# Patient Record
Sex: FEMALE | Race: BLACK OR AFRICAN AMERICAN | Employment: FULL TIME | URBAN - METROPOLITAN AREA
[De-identification: names, ages, dates, MRNs, and addresses within clinical notes are randomized per-mention and may not be internally consistent; named-entity substitution may affect disease eponyms.]

---

## 2018-01-18 ENCOUNTER — HOSPITAL ENCOUNTER (OUTPATIENT)
Dept: MAMMOGRAPHY | Age: 48
Discharge: HOME | End: 2018-01-18
Attending: ALLERGY & IMMUNOLOGY | Admitting: ALLERGY & IMMUNOLOGY

## 2018-01-18 DIAGNOSIS — Z12.31 VISIT FOR SCREENING MAMMOGRAM: ICD-10-CM

## 2019-01-21 ENCOUNTER — HOSPITAL ENCOUNTER (OUTPATIENT)
Dept: MAMMOGRAPHY | Age: 49
Discharge: HOME | End: 2019-01-21
Payer: COMMERCIAL

## 2019-01-21 DIAGNOSIS — Z12.31 VISIT FOR SCREENING MAMMOGRAM: ICD-10-CM

## 2019-01-21 PROCEDURE — 77063 BREAST TOMOSYNTHESIS BI: CPT

## 2019-08-29 ENCOUNTER — HOSPITAL ENCOUNTER (OUTPATIENT)
Dept: MAMMOGRAPHY | Age: 49
Discharge: HOME | End: 2019-08-29
Payer: COMMERCIAL

## 2019-08-29 ENCOUNTER — HOSPITAL ENCOUNTER (OUTPATIENT)
Dept: ULTRASOUND IMAGING | Age: 49
Discharge: HOME | End: 2019-08-29
Payer: COMMERCIAL

## 2019-08-29 DIAGNOSIS — N63.0 BREAST MASS: ICD-10-CM

## 2019-08-29 DIAGNOSIS — N63.0 BREAST LUMP: ICD-10-CM

## 2019-08-29 DIAGNOSIS — R92.8 ABNORMAL FINDING ON MAMMOGRAPHY: ICD-10-CM

## 2019-08-29 PROCEDURE — 76642 ULTRASOUND BREAST LIMITED: CPT

## 2019-08-29 PROCEDURE — 88305 TISSUE EXAM BY PATHOLOGIST: CPT

## 2019-08-29 PROCEDURE — 77065 DX MAMMO INCL CAD UNI: CPT

## 2019-08-29 PROCEDURE — 88360 TUMOR IMMUNOHISTOCHEM/MANUAL: CPT

## 2019-08-29 PROCEDURE — 2709999900 US BREAST BIOPSY W LOC DEVICE 1ST LESION LEFT

## 2019-09-10 ENCOUNTER — HOSPITAL ENCOUNTER (OUTPATIENT)
Dept: NON INVASIVE DIAGNOSTICS | Age: 49
Discharge: HOME | End: 2019-09-10
Payer: COMMERCIAL

## 2019-09-10 LAB
LV EF: 58 %
LVEF MODALITY: NORMAL

## 2019-09-10 PROCEDURE — 93306 TTE W/DOPPLER COMPLETE: CPT

## 2019-09-11 ENCOUNTER — HOSPITAL ENCOUNTER (OUTPATIENT)
Dept: MRI IMAGING | Age: 49
Discharge: HOME | End: 2019-09-11
Payer: COMMERCIAL

## 2019-09-11 DIAGNOSIS — C50.312 MALIGNANT NEOPLASM OF LOWER-INNER QUADRANT OF LEFT FEMALE BREAST, UNSPECIFIED ESTROGEN RECEPTOR STATUS (HCC): ICD-10-CM

## 2019-09-11 PROCEDURE — 6360000004 HC RX CONTRAST MEDICATION: Performed by: FAMILY MEDICINE

## 2019-09-11 PROCEDURE — A9579 GAD-BASE MR CONTRAST NOS,1ML: HCPCS | Performed by: FAMILY MEDICINE

## 2019-09-11 PROCEDURE — 77049 MRI BREAST C-+ W/CAD BI: CPT

## 2019-09-11 RX ADMIN — GADOTERIDOL 18 ML: 279.3 INJECTION, SOLUTION INTRAVENOUS at 17:53

## 2019-09-13 ENCOUNTER — ANESTHESIA EVENT (OUTPATIENT)
Dept: OPERATING ROOM | Age: 49
End: 2019-09-13
Payer: COMMERCIAL

## 2019-09-15 PROBLEM — I87.8 POOR VENOUS ACCESS: Status: ACTIVE | Noted: 2019-09-15

## 2019-09-16 ENCOUNTER — HOSPITAL ENCOUNTER (OUTPATIENT)
Age: 49
Setting detail: OUTPATIENT SURGERY
Discharge: HOME | End: 2019-09-16
Attending: SURGERY | Admitting: SURGERY
Payer: COMMERCIAL

## 2019-09-16 ENCOUNTER — APPOINTMENT (OUTPATIENT)
Dept: GENERAL RADIOLOGY | Age: 49
End: 2019-09-16
Attending: SURGERY
Payer: COMMERCIAL

## 2019-09-16 ENCOUNTER — ANESTHESIA (OUTPATIENT)
Dept: OPERATING ROOM | Age: 49
End: 2019-09-16
Payer: COMMERCIAL

## 2019-09-16 VITALS
RESPIRATION RATE: 14 BRPM | OXYGEN SATURATION: 99 % | SYSTOLIC BLOOD PRESSURE: 110 MMHG | DIASTOLIC BLOOD PRESSURE: 68 MMHG

## 2019-09-16 VITALS
RESPIRATION RATE: 16 BRPM | TEMPERATURE: 97.5 F | HEART RATE: 60 BPM | DIASTOLIC BLOOD PRESSURE: 82 MMHG | WEIGHT: 190 LBS | OXYGEN SATURATION: 100 % | BODY MASS INDEX: 27.2 KG/M2 | SYSTOLIC BLOOD PRESSURE: 123 MMHG | HEIGHT: 70 IN

## 2019-09-16 DIAGNOSIS — I87.8 POOR VENOUS ACCESS: Primary | ICD-10-CM

## 2019-09-16 LAB
A/G RATIO: 1.6 (ref 1.1–2.2)
ALBUMIN SERPL-MCNC: 4.3 G/DL (ref 3.4–5)
ALP BLD-CCNC: 62 U/L (ref 40–129)
ALT SERPL-CCNC: 14 U/L (ref 10–40)
ANION GAP SERPL CALCULATED.3IONS-SCNC: 10 MMOL/L (ref 3–16)
AST SERPL-CCNC: 16 U/L (ref 15–37)
BILIRUB SERPL-MCNC: 0.3 MG/DL (ref 0–1)
BUN BLDV-MCNC: 14 MG/DL (ref 7–20)
CALCIUM SERPL-MCNC: 9.2 MG/DL (ref 8.3–10.6)
CHLORIDE BLD-SCNC: 105 MMOL/L (ref 99–110)
CO2: 26 MMOL/L (ref 21–32)
CREAT SERPL-MCNC: 1 MG/DL (ref 0.6–1.1)
GFR AFRICAN AMERICAN: >60
GFR NON-AFRICAN AMERICAN: 58
GLOBULIN: 2.7 G/DL
GLUCOSE BLD-MCNC: 95 MG/DL (ref 70–99)
HCT VFR BLD CALC: 35.8 % (ref 36–48)
HEMOGLOBIN: 12.2 G/DL (ref 12–16)
MCH RBC QN AUTO: 32 PG (ref 26–34)
MCHC RBC AUTO-ENTMCNC: 34 G/DL (ref 31–36)
MCV RBC AUTO: 94.1 FL (ref 80–100)
PDW BLD-RTO: 13.3 % (ref 12.4–15.4)
PLATELET # BLD: 286 K/UL (ref 135–450)
PMV BLD AUTO: 7.7 FL (ref 5–10.5)
POTASSIUM SERPL-SCNC: 4 MMOL/L (ref 3.5–5.1)
RBC # BLD: 3.8 M/UL (ref 4–5.2)
SODIUM BLD-SCNC: 141 MMOL/L (ref 136–145)
TOTAL PROTEIN: 7 G/DL (ref 6.4–8.2)
WBC # BLD: 4.4 K/UL (ref 4–11)

## 2019-09-16 PROCEDURE — 71045 X-RAY EXAM CHEST 1 VIEW: CPT

## 2019-09-16 PROCEDURE — 71046 X-RAY EXAM CHEST 2 VIEWS: CPT

## 2019-09-16 PROCEDURE — 3700000000 HC ANESTHESIA ATTENDED CARE: Performed by: SURGERY

## 2019-09-16 PROCEDURE — 77001 FLUOROGUIDE FOR VEIN DEVICE: CPT

## 2019-09-16 PROCEDURE — 7100000011 HC PHASE II RECOVERY - ADDTL 15 MIN: Performed by: SURGERY

## 2019-09-16 PROCEDURE — 6360000002 HC RX W HCPCS: Performed by: NURSE ANESTHETIST, CERTIFIED REGISTERED

## 2019-09-16 PROCEDURE — 7100000001 HC PACU RECOVERY - ADDTL 15 MIN: Performed by: SURGERY

## 2019-09-16 PROCEDURE — 6360000002 HC RX W HCPCS: Performed by: SURGERY

## 2019-09-16 PROCEDURE — C1788 PORT, INDWELLING, IMP: HCPCS | Performed by: SURGERY

## 2019-09-16 PROCEDURE — 3700000001 HC ADD 15 MINUTES (ANESTHESIA): Performed by: SURGERY

## 2019-09-16 PROCEDURE — 85027 COMPLETE CBC AUTOMATED: CPT

## 2019-09-16 PROCEDURE — 2580000003 HC RX 258: Performed by: ANESTHESIOLOGY

## 2019-09-16 PROCEDURE — 7100000010 HC PHASE II RECOVERY - FIRST 15 MIN: Performed by: SURGERY

## 2019-09-16 PROCEDURE — 7100000000 HC PACU RECOVERY - FIRST 15 MIN: Performed by: SURGERY

## 2019-09-16 PROCEDURE — 2500000003 HC RX 250 WO HCPCS: Performed by: SURGERY

## 2019-09-16 PROCEDURE — 3600000003 HC SURGERY LEVEL 3 BASE: Performed by: SURGERY

## 2019-09-16 PROCEDURE — 2580000003 HC RX 258: Performed by: SURGERY

## 2019-09-16 PROCEDURE — 3600000013 HC SURGERY LEVEL 3 ADDTL 15MIN: Performed by: SURGERY

## 2019-09-16 PROCEDURE — 2709999900 HC NON-CHARGEABLE SUPPLY: Performed by: SURGERY

## 2019-09-16 PROCEDURE — 2500000003 HC RX 250 WO HCPCS: Performed by: NURSE ANESTHETIST, CERTIFIED REGISTERED

## 2019-09-16 PROCEDURE — 80053 COMPREHEN METABOLIC PANEL: CPT

## 2019-09-16 DEVICE — PORT INFUS 6FR SLIM POLYUR ATTCH OPN END SGL LUMN VEN CATH: Type: IMPLANTABLE DEVICE | Status: FUNCTIONAL

## 2019-09-16 RX ORDER — PROPOFOL 10 MG/ML
INJECTION, EMULSION INTRAVENOUS CONTINUOUS PRN
Status: DISCONTINUED | OUTPATIENT
Start: 2019-09-16 | End: 2019-09-16 | Stop reason: SDUPTHER

## 2019-09-16 RX ORDER — HYDRALAZINE HYDROCHLORIDE 20 MG/ML
5 INJECTION INTRAMUSCULAR; INTRAVENOUS EVERY 10 MIN PRN
Status: DISCONTINUED | OUTPATIENT
Start: 2019-09-16 | End: 2019-09-16 | Stop reason: HOSPADM

## 2019-09-16 RX ORDER — PROMETHAZINE HYDROCHLORIDE 25 MG/ML
6.25 INJECTION, SOLUTION INTRAMUSCULAR; INTRAVENOUS
Status: DISCONTINUED | OUTPATIENT
Start: 2019-09-16 | End: 2019-09-16 | Stop reason: HOSPADM

## 2019-09-16 RX ORDER — MEPERIDINE HYDROCHLORIDE 25 MG/ML
12.5 INJECTION INTRAMUSCULAR; INTRAVENOUS; SUBCUTANEOUS EVERY 5 MIN PRN
Status: DISCONTINUED | OUTPATIENT
Start: 2019-09-16 | End: 2019-09-16 | Stop reason: HOSPADM

## 2019-09-16 RX ORDER — SODIUM CHLORIDE, SODIUM LACTATE, POTASSIUM CHLORIDE, CALCIUM CHLORIDE 600; 310; 30; 20 MG/100ML; MG/100ML; MG/100ML; MG/100ML
INJECTION, SOLUTION INTRAVENOUS CONTINUOUS
Status: DISCONTINUED | OUTPATIENT
Start: 2019-09-16 | End: 2019-09-16 | Stop reason: HOSPADM

## 2019-09-16 RX ORDER — PHENYLEPHRINE HYDROCHLORIDE 10 MG/ML
INJECTION INTRAVENOUS PRN
Status: DISCONTINUED | OUTPATIENT
Start: 2019-09-16 | End: 2019-09-16 | Stop reason: SDUPTHER

## 2019-09-16 RX ORDER — SODIUM CHLORIDE, SODIUM LACTATE, POTASSIUM CHLORIDE, CALCIUM CHLORIDE 600; 310; 30; 20 MG/100ML; MG/100ML; MG/100ML; MG/100ML
INJECTION, SOLUTION INTRAVENOUS ONCE
Status: COMPLETED | OUTPATIENT
Start: 2019-09-16 | End: 2019-09-16

## 2019-09-16 RX ORDER — HEPARIN SODIUM (PORCINE) LOCK FLUSH IV SOLN 100 UNIT/ML 100 UNIT/ML
SOLUTION INTRAVENOUS PRN
Status: DISCONTINUED | OUTPATIENT
Start: 2019-09-16 | End: 2019-09-16 | Stop reason: ALTCHOICE

## 2019-09-16 RX ORDER — MIDAZOLAM HYDROCHLORIDE 1 MG/ML
INJECTION INTRAMUSCULAR; INTRAVENOUS PRN
Status: DISCONTINUED | OUTPATIENT
Start: 2019-09-16 | End: 2019-09-16 | Stop reason: SDUPTHER

## 2019-09-16 RX ORDER — METOCLOPRAMIDE HYDROCHLORIDE 5 MG/ML
10 INJECTION INTRAMUSCULAR; INTRAVENOUS
Status: DISCONTINUED | OUTPATIENT
Start: 2019-09-16 | End: 2019-09-16 | Stop reason: HOSPADM

## 2019-09-16 RX ORDER — OXYCODONE HYDROCHLORIDE 5 MG/1
10 TABLET ORAL PRN
Status: DISCONTINUED | OUTPATIENT
Start: 2019-09-16 | End: 2019-09-16 | Stop reason: HOSPADM

## 2019-09-16 RX ORDER — GLYCOPYRROLATE 0.2 MG/ML
INJECTION INTRAMUSCULAR; INTRAVENOUS PRN
Status: DISCONTINUED | OUTPATIENT
Start: 2019-09-16 | End: 2019-09-16 | Stop reason: SDUPTHER

## 2019-09-16 RX ORDER — MORPHINE SULFATE 4 MG/ML
1 INJECTION, SOLUTION INTRAMUSCULAR; INTRAVENOUS EVERY 5 MIN PRN
Status: DISCONTINUED | OUTPATIENT
Start: 2019-09-16 | End: 2019-09-16 | Stop reason: HOSPADM

## 2019-09-16 RX ORDER — LABETALOL 20 MG/4 ML (5 MG/ML) INTRAVENOUS SYRINGE
5 EVERY 10 MIN PRN
Status: DISCONTINUED | OUTPATIENT
Start: 2019-09-16 | End: 2019-09-16 | Stop reason: HOSPADM

## 2019-09-16 RX ORDER — OXYCODONE HYDROCHLORIDE 5 MG/1
5 TABLET ORAL PRN
Status: DISCONTINUED | OUTPATIENT
Start: 2019-09-16 | End: 2019-09-16 | Stop reason: HOSPADM

## 2019-09-16 RX ORDER — DIPHENHYDRAMINE HYDROCHLORIDE 50 MG/ML
12.5 INJECTION INTRAMUSCULAR; INTRAVENOUS
Status: DISCONTINUED | OUTPATIENT
Start: 2019-09-16 | End: 2019-09-16 | Stop reason: HOSPADM

## 2019-09-16 RX ORDER — KETOROLAC TROMETHAMINE 30 MG/ML
INJECTION, SOLUTION INTRAMUSCULAR; INTRAVENOUS PRN
Status: DISCONTINUED | OUTPATIENT
Start: 2019-09-16 | End: 2019-09-16 | Stop reason: SDUPTHER

## 2019-09-16 RX ORDER — SODIUM CHLORIDE 0.9 % (FLUSH) 0.9 %
SYRINGE (ML) INJECTION PRN
Status: DISCONTINUED | OUTPATIENT
Start: 2019-09-16 | End: 2019-09-16 | Stop reason: ALTCHOICE

## 2019-09-16 RX ORDER — HYDROCODONE BITARTRATE AND ACETAMINOPHEN 5; 325 MG/1; MG/1
1 TABLET ORAL EVERY 6 HOURS PRN
Qty: 12 TABLET | Refills: 0 | Status: SHIPPED | OUTPATIENT
Start: 2019-09-16 | End: 2019-09-19

## 2019-09-16 RX ADMIN — Medication 2 G: at 07:32

## 2019-09-16 RX ADMIN — MIDAZOLAM HYDROCHLORIDE 2 MG: 2 INJECTION, SOLUTION INTRAMUSCULAR; INTRAVENOUS at 07:29

## 2019-09-16 RX ADMIN — PHENYLEPHRINE HYDROCHLORIDE 160 MCG: 10 INJECTION INTRAVENOUS at 07:41

## 2019-09-16 RX ADMIN — MIDAZOLAM HYDROCHLORIDE 2 MG: 2 INJECTION, SOLUTION INTRAMUSCULAR; INTRAVENOUS at 07:19

## 2019-09-16 RX ADMIN — GLYCOPYRROLATE 0.2 MG: 0.2 INJECTION INTRAMUSCULAR; INTRAVENOUS at 07:32

## 2019-09-16 RX ADMIN — PROPOFOL 100 MCG/KG/MIN: 10 INJECTION, EMULSION INTRAVENOUS at 07:40

## 2019-09-16 RX ADMIN — KETOROLAC TROMETHAMINE 30 MG: 30 INJECTION, SOLUTION INTRAMUSCULAR; INTRAVENOUS at 08:13

## 2019-09-16 RX ADMIN — SODIUM CHLORIDE, SODIUM LACTATE, POTASSIUM CHLORIDE, AND CALCIUM CHLORIDE: 600; 310; 30; 20 INJECTION, SOLUTION INTRAVENOUS at 07:01

## 2019-09-16 RX ADMIN — SODIUM CHLORIDE, POTASSIUM CHLORIDE, SODIUM LACTATE AND CALCIUM CHLORIDE: 600; 310; 30; 20 INJECTION, SOLUTION INTRAVENOUS at 06:30

## 2019-09-16 ASSESSMENT — PAIN - FUNCTIONAL ASSESSMENT: PAIN_FUNCTIONAL_ASSESSMENT: 0-10

## 2019-09-16 ASSESSMENT — PULMONARY FUNCTION TESTS
PIF_VALUE: 0
PIF_VALUE: 1
PIF_VALUE: 0
PIF_VALUE: 1
PIF_VALUE: 3
PIF_VALUE: 0
PIF_VALUE: 1
PIF_VALUE: 0
PIF_VALUE: 0
PIF_VALUE: 1
PIF_VALUE: 0
PIF_VALUE: 1
PIF_VALUE: 0

## 2019-09-16 ASSESSMENT — PAIN SCALES - GENERAL: PAINLEVEL_OUTOF10: 0

## 2019-09-16 NOTE — ANESTHESIA PRE PROCEDURE
Department of Anesthesiology  Preprocedure Note       Name:  Sal Ahn   Age:  44 y.o.  :  1970                                          MRN:  [id number]         Date:  9/15/2019      Surgeon: Adela Uriostegui):  Jacqui Perez MD    Procedure: RIGHT PORT PLACEMENT (Right )    Medications prior to admission:   Prior to Admission medications    Not on File       Current medications:    No current facility-administered medications for this encounter. No current outpatient medications on file. Allergies: Allergies not on file    Problem List:    Patient Active Problem List   Diagnosis Code    Poor venous access I87.8       Past Medical History:        Diagnosis Date    Cancer ([facility])     breast    Seizure disorder ([facility] [place])     tonic clonic seizures       Past Surgical History:        Procedure Laterality Date    BREAST SURGERY      bx of breast    DILATION AND CURETTAGE OF UTERUS      HYSTERECTOMY         Social History:    Social History     Tobacco Use    Smoking status: Not on file   Substance Use Topics    Alcohol use: Not on file                                Counseling given: Not Answered      Vital Signs (Current): There were no vitals filed for this visit. BP Readings from Last 3 Encounters:   No data found for BP       NPO Status:                                                                                 BMI:   Wt Readings from Last 3 Encounters:   No data found for Wt     There is no height or weight on file to calculate BMI.    CBC: No results found for: WBC, RBC, HGB, HCT, MCV, RDW, PLT    CMP: No results found for: NA, K, CL, CO2, BUN, CREATININE, GFRAA, AGRATIO, LABGLOM, GLUCOSE, PROT, CALCIUM, BILITOT, ALKPHOS, AST, ALT    POC Tests: No results for input(s): POCGLU, POCNA, POCK, POCCL, POCBUN, POCHEMO, POCHCT in the last 72 hours.     Coags: No results found for: PROTIME, INR, APTT    HCG (If Applicable): No results found for:

## 2019-09-16 NOTE — PROGRESS NOTES
Gauri Mems in PACU to see patient, CXR showed no pneumothorax.
PACU Transfer to [facility]    Vitals:    09/16/19 0930   BP: 121/80   Pulse: 67   Resp: 17   Temp: 97.9 °F (36.6 °C)   SpO2: 100%         Intake/Output Summary (Last 24 hours) at 9/16/2019 0949  Last data filed at 9/16/2019 0836  Gross per 24 hour   Intake 700 ml   Output 15 ml   Net 685 ml       Pain assessment:  none  Pain Level: 0    Patient transferred to care of [facility] RN.    9/16/2019 9:49 AM
PRE-OP INSTRUCTIONS FOR THE SURGICAL PATIENT YOU ARE UNABLE TO MAKE CONTACT FOR AN INTERVIEW:      1. Follow instructions for your ARRIVAL TIME as DIRECTED BY YOUR SURGEON. 2. Enter the MAIN entrance located on [email or web address] and report to the desk. 3. Bring your insurance & prescription card and photo ID with you. You may also be asked to pay a co-pay, as you may want to bring a check or credit card with you. 4. Leave all other valuables at home. 5. Arrange for someone to drive you home and be with you for the first 24 hours after discharge. 6. You must contact your surgeon for Instructions regarding:         - ALL medication instructions, especially if taking blood thinners, aspirin, or diabetic medication.  - IF  there is a change in your physical condition such as a cold, fever, rash, cuts, sores or any other infection, especially near your surgical site. 7. A Pre-op History and Physical for surgery MUST be completed by your Physician or an Urgent Care within 30 days of your procedure date. Please bring a copy with you on the day of your procedure and along with any other testing performed. 8. DO NOT EAT ANYTHING eight hours prior to surgery. May have up to 8 ounces of water 4 hours prior to surgery. 9. No gum, candy, mints, or ice chips day of procedure. 10. Please refrain from drinking alcohol the day before or day of your procedure. 11. Please do not smoke the day of your procedure. 12. Dress in loose, comfortable clothing appropriate for redressing after your procedure. Do not wear jewelry (including body piercings), make-up, fingernail polish, lotion, powders or metal hairclips. 15. Contacts will need to be removed prior to surgery. You may want to bring your            Eye glasses to wear immediately before and after surgery. 14. Dentures will need to be removed before your procedure.    13. Bring cases for your glasses, contacts, dentures, or hearing aids to
UNABLE TO REACH PT, UNABLE TO LOCATE CXR OR LABS PER SURGEON ORDERS, ORDERED DOS.
effect during this procedure. I give my doctor permission to give me blood or blood products. I understand that there are risks with receiving blood such as hepatitis, AIDS, fever, or allergic reaction. I acknowledge that the risks, benefits, and alternatives of this treatment have been explained to me and that no express or implied warranty has been given by the hospital, any blood bank, or any person or entity as to the blood or blood components transfused. At the discretion of my doctor, I agree to allow observers, equipment/product representatives and allow photographing, and/or televising of the procedure, provided my name or identity is maintained confidentially. I agree the hospital may dispose of or use for scientific or educational purposes any tissue, fluid, or body parts which may be removed.     ________________________________Date________Time______ am/pm  ([employer])  Patient or Signature of Closest Relative or Legal Guardian    ________________________________Date________Time______am/pm      Page 1 of  1  Witness

## 2019-09-16 NOTE — BRIEF OP NOTE
Brief Postoperative Note  ______________________________________________________________    Patient: Gal Canal  YOB: 1970  MRN: [id number]  Date of Procedure: 9/16/2019    Pre-Op Diagnosis: POOR VENOUS ACCESS, MALIGNANT NEOPLASM LOWER INNER QUADRANT LEFT BREAST, ESTROGEN RECEPTOR NEGATIVE    Post-Op Diagnosis: Same       Procedure(s):  RIGHT SUBCLAVIAN PORT PLACEMENT    Anesthesia: General    Surgeon(s):  Aletha Costa MD    Assistant: Mick Ramon SA    Estimated Blood Loss (mL): less than 50     Complications: None    Specimens:   * No specimens in log *    Implants:  * No implants in log *      Drains: * No LDAs found *    Findings: tip in Mahesh Boss MD  Date: 9/16/2019  Time: 8:45 AM

## 2019-09-16 NOTE — OP NOTE
silk sutures were  passed through the port and pectoralis fascia and left untied. A  diameter sheath was passed over the wire. The wire and dilator were  withdrawn. The catheter was passed through the introducer sheath which  was then torn away. We cut the catheter to the appropriate length and  connected it to the port. The port was delivered into the subcutaneous  pocket and tacking sutures were tied. The incisions was closed with  interrupted deep dermal sutures followed by 4-0 Vicryl subcuticular skin  closure. The port was accessed with good venous return and flushed with  20 mL of injectable saline and 5 mL of heparin flush. The port placed  was a 6-[place] ClearVUE PowerPort. The profile reference number is  [id number], lot #[id number]. Postoperative chest x-ray and recovery  documented the absence of pneumothorax and the presence of the tip of  the catheter within the superior vena cava.         Adela Hennessy MD    D: 09/16/2019 9:54:42       T: 09/16/2019 11:27:04     [email or web address]  Job#: [id number]     Doc#: [id number]    CC:

## 2019-09-19 ENCOUNTER — HOSPITAL ENCOUNTER (OUTPATIENT)
Dept: ULTRASOUND IMAGING | Age: 49
Discharge: HOME | End: 2019-09-19
Payer: COMMERCIAL

## 2019-09-19 ENCOUNTER — APPOINTMENT (OUTPATIENT)
Dept: ULTRASOUND IMAGING | Age: 49
End: 2019-09-19
Payer: COMMERCIAL

## 2019-09-19 DIAGNOSIS — R93.89 ABNORMAL MRI: ICD-10-CM

## 2019-09-19 PROCEDURE — 76642 ULTRASOUND BREAST LIMITED: CPT

## 2019-09-19 PROCEDURE — 76882 US LMTD JT/FCL EVL NVASC XTR: CPT

## 2019-12-23 ENCOUNTER — HOSPITAL ENCOUNTER (OUTPATIENT)
Dept: VASCULAR LAB | Age: 49
Discharge: HOME | End: 2019-12-23
Payer: COMMERCIAL

## 2019-12-23 PROCEDURE — 93971 EXTREMITY STUDY: CPT

## 2020-01-09 ENCOUNTER — HOSPITAL ENCOUNTER (OUTPATIENT)
Dept: ULTRASOUND IMAGING | Age: 50
Discharge: HOME | End: 2020-01-09
Payer: COMMERCIAL

## 2020-01-09 PROCEDURE — 76642 ULTRASOUND BREAST LIMITED: CPT

## 2020-02-03 ENCOUNTER — HOSPITAL ENCOUNTER (OUTPATIENT)
Dept: MAMMOGRAPHY | Age: 50
Discharge: HOME | End: 2020-02-03
Payer: COMMERCIAL

## 2020-02-03 ENCOUNTER — HOSPITAL ENCOUNTER (OUTPATIENT)
Dept: ULTRASOUND IMAGING | Age: 50
Discharge: HOME | End: 2020-02-03
Payer: COMMERCIAL

## 2020-02-03 PROBLEM — Z17.1 MALIGNANT NEOPLASM OF LOWER-INNER QUADRANT OF LEFT BREAST IN FEMALE, ESTROGEN RECEPTOR NEGATIVE (HCC): Status: ACTIVE | Noted: 2020-02-03

## 2020-02-03 PROBLEM — C50.312 MALIGNANT NEOPLASM OF LOWER-INNER QUADRANT OF LEFT BREAST IN FEMALE, ESTROGEN RECEPTOR NEGATIVE (HCC): Status: ACTIVE | Noted: 2020-02-03

## 2020-02-03 PROCEDURE — 77065 DX MAMMO INCL CAD UNI: CPT

## 2020-02-03 PROCEDURE — 2709999900 US PLACE BREAST LOC DEVICE 1ST LESION LEFT

## 2020-02-04 ENCOUNTER — ANESTHESIA (OUTPATIENT)
Dept: OPERATING ROOM | Age: 50
End: 2020-02-04
Payer: COMMERCIAL

## 2020-02-04 ENCOUNTER — HOSPITAL ENCOUNTER (OUTPATIENT)
Dept: NUCLEAR MEDICINE | Age: 50
Discharge: HOME | End: 2020-02-04
Payer: COMMERCIAL

## 2020-02-04 ENCOUNTER — ANESTHESIA EVENT (OUTPATIENT)
Dept: OPERATING ROOM | Age: 50
End: 2020-02-04
Payer: COMMERCIAL

## 2020-02-04 ENCOUNTER — HOSPITAL ENCOUNTER (OUTPATIENT)
Age: 50
Setting detail: OUTPATIENT SURGERY
Discharge: HOME | End: 2020-02-04
Attending: SURGERY | Admitting: SURGERY
Payer: COMMERCIAL

## 2020-02-04 ENCOUNTER — HOSPITAL ENCOUNTER (OUTPATIENT)
Dept: MAMMOGRAPHY | Age: 50
Discharge: HOME | End: 2020-02-04
Payer: COMMERCIAL

## 2020-02-04 VITALS
OXYGEN SATURATION: 100 % | SYSTOLIC BLOOD PRESSURE: 135 MMHG | BODY MASS INDEX: 26.2 KG/M2 | RESPIRATION RATE: 12 BRPM | WEIGHT: 183 LBS | TEMPERATURE: 97.8 F | DIASTOLIC BLOOD PRESSURE: 79 MMHG | HEART RATE: 76 BPM | HEIGHT: 70 IN

## 2020-02-04 VITALS — SYSTOLIC BLOOD PRESSURE: 132 MMHG | DIASTOLIC BLOOD PRESSURE: 88 MMHG | OXYGEN SATURATION: 100 %

## 2020-02-04 LAB
A/G RATIO: 1.4 (ref 1.1–2.2)
ALBUMIN SERPL-MCNC: 3.9 G/DL (ref 3.4–5)
ALP BLD-CCNC: 69 U/L (ref 40–129)
ALT SERPL-CCNC: 12 U/L (ref 10–40)
ANION GAP SERPL CALCULATED.3IONS-SCNC: 10 MMOL/L (ref 3–16)
AST SERPL-CCNC: 15 U/L (ref 15–37)
BILIRUB SERPL-MCNC: 0.3 MG/DL (ref 0–1)
BUN BLDV-MCNC: 12 MG/DL (ref 7–20)
CALCIUM SERPL-MCNC: 9.4 MG/DL (ref 8.3–10.6)
CHLORIDE BLD-SCNC: 105 MMOL/L (ref 99–110)
CO2: 27 MMOL/L (ref 21–32)
CREAT SERPL-MCNC: 0.8 MG/DL (ref 0.6–1.1)
GFR AFRICAN AMERICAN: >60
GFR NON-AFRICAN AMERICAN: >60
GLOBULIN: 2.7 G/DL
GLUCOSE BLD-MCNC: 88 MG/DL (ref 70–99)
HCT VFR BLD CALC: 30.9 % (ref 36–48)
HEMOGLOBIN: 10.3 G/DL (ref 12–16)
MCH RBC QN AUTO: 34.3 PG (ref 26–34)
MCHC RBC AUTO-ENTMCNC: 33.2 G/DL (ref 31–36)
MCV RBC AUTO: 103.1 FL (ref 80–100)
PDW BLD-RTO: 16.3 % (ref 12.4–15.4)
PLATELET # BLD: 239 K/UL (ref 135–450)
PMV BLD AUTO: 7.5 FL (ref 5–10.5)
POTASSIUM SERPL-SCNC: 4.5 MMOL/L (ref 3.5–5.1)
RBC # BLD: 2.99 M/UL (ref 4–5.2)
SODIUM BLD-SCNC: 142 MMOL/L (ref 136–145)
TOTAL PROTEIN: 6.6 G/DL (ref 6.4–8.2)
WBC # BLD: 5.6 K/UL (ref 4–11)

## 2020-02-04 PROCEDURE — 3430000000 HC RX DIAGNOSTIC RADIOPHARMACEUTICAL: Performed by: SURGERY

## 2020-02-04 PROCEDURE — 3700000001 HC ADD 15 MINUTES (ANESTHESIA): Performed by: SURGERY

## 2020-02-04 PROCEDURE — 6360000002 HC RX W HCPCS: Performed by: ANESTHESIOLOGY

## 2020-02-04 PROCEDURE — 6360000002 HC RX W HCPCS: Performed by: SURGERY

## 2020-02-04 PROCEDURE — 2580000003 HC RX 258: Performed by: SURGERY

## 2020-02-04 PROCEDURE — 76098 X-RAY EXAM SURGICAL SPECIMEN: CPT

## 2020-02-04 PROCEDURE — 2580000003 HC RX 258: Performed by: ANESTHESIOLOGY

## 2020-02-04 PROCEDURE — 88307 TISSUE EXAM BY PATHOLOGIST: CPT

## 2020-02-04 PROCEDURE — 7100000001 HC PACU RECOVERY - ADDTL 15 MIN: Performed by: SURGERY

## 2020-02-04 PROCEDURE — 3700000000 HC ANESTHESIA ATTENDED CARE: Performed by: SURGERY

## 2020-02-04 PROCEDURE — 3600000004 HC SURGERY LEVEL 4 BASE: Performed by: SURGERY

## 2020-02-04 PROCEDURE — A9541 TC99M SULFUR COLLOID: HCPCS | Performed by: SURGERY

## 2020-02-04 PROCEDURE — 6360000002 HC RX W HCPCS: Performed by: NURSE ANESTHETIST, CERTIFIED REGISTERED

## 2020-02-04 PROCEDURE — 2709999900 HC NON-CHARGEABLE SUPPLY: Performed by: SURGERY

## 2020-02-04 PROCEDURE — 3600000014 HC SURGERY LEVEL 4 ADDTL 15MIN: Performed by: SURGERY

## 2020-02-04 PROCEDURE — 85027 COMPLETE CBC AUTOMATED: CPT

## 2020-02-04 PROCEDURE — 38792 RA TRACER ID OF SENTINL NODE: CPT

## 2020-02-04 PROCEDURE — 88331 PATH CONSLTJ SURG 1 BLK 1SPC: CPT

## 2020-02-04 PROCEDURE — A4648 IMPLANTABLE TISSUE MARKER: HCPCS | Performed by: SURGERY

## 2020-02-04 PROCEDURE — 7100000000 HC PACU RECOVERY - FIRST 15 MIN: Performed by: SURGERY

## 2020-02-04 PROCEDURE — 88342 IMHCHEM/IMCYTCHM 1ST ANTB: CPT

## 2020-02-04 PROCEDURE — C9290 INJ, BUPIVACAINE LIPOSOME: HCPCS | Performed by: SURGERY

## 2020-02-04 PROCEDURE — 80053 COMPREHEN METABOLIC PANEL: CPT

## 2020-02-04 PROCEDURE — 2500000003 HC RX 250 WO HCPCS: Performed by: NURSE ANESTHETIST, CERTIFIED REGISTERED

## 2020-02-04 PROCEDURE — 88305 TISSUE EXAM BY PATHOLOGIST: CPT

## 2020-02-04 DEVICE — Z INACTIVE USE 2535481 MARKER SURG W2XH1XL2CM BIOZORB LO PROF: Type: IMPLANTABLE DEVICE | Site: BREAST | Status: FUNCTIONAL

## 2020-02-04 RX ORDER — SUCCINYLCHOLINE/SOD CL,ISO/PF 200MG/10ML
SYRINGE (ML) INTRAVENOUS PRN
Status: DISCONTINUED | OUTPATIENT
Start: 2020-02-04 | End: 2020-02-04 | Stop reason: SDUPTHER

## 2020-02-04 RX ORDER — DIPHENOXYLATE HYDROCHLORIDE AND ATROPINE SULFATE 2.5; .025 MG/1; MG/1
1 TABLET ORAL 4 TIMES DAILY PRN
COMMUNITY

## 2020-02-04 RX ORDER — HYDROCODONE BITARTRATE AND ACETAMINOPHEN 5; 325 MG/1; MG/1
1 TABLET ORAL
Status: DISCONTINUED | OUTPATIENT
Start: 2020-02-04 | End: 2020-02-04 | Stop reason: HOSPADM

## 2020-02-04 RX ORDER — FENTANYL CITRATE 50 UG/ML
INJECTION, SOLUTION INTRAMUSCULAR; INTRAVENOUS PRN
Status: DISCONTINUED | OUTPATIENT
Start: 2020-02-04 | End: 2020-02-04 | Stop reason: SDUPTHER

## 2020-02-04 RX ORDER — OXYCODONE HYDROCHLORIDE AND ACETAMINOPHEN 5; 325 MG/1; MG/1
1 TABLET ORAL EVERY 4 HOURS PRN
Qty: 20 TABLET | Refills: 0 | Status: SHIPPED | OUTPATIENT
Start: 2020-02-04 | End: 2020-02-11

## 2020-02-04 RX ORDER — MIDAZOLAM HYDROCHLORIDE 1 MG/ML
INJECTION INTRAMUSCULAR; INTRAVENOUS PRN
Status: DISCONTINUED | OUTPATIENT
Start: 2020-02-04 | End: 2020-02-04 | Stop reason: SDUPTHER

## 2020-02-04 RX ORDER — ONDANSETRON HYDROCHLORIDE 8 MG/1
8 TABLET, FILM COATED ORAL EVERY 8 HOURS PRN
COMMUNITY

## 2020-02-04 RX ORDER — ROCURONIUM BROMIDE 10 MG/ML
INJECTION, SOLUTION INTRAVENOUS PRN
Status: DISCONTINUED | OUTPATIENT
Start: 2020-02-04 | End: 2020-02-04 | Stop reason: SDUPTHER

## 2020-02-04 RX ORDER — MAGNESIUM HYDROXIDE 1200 MG/15ML
LIQUID ORAL CONTINUOUS PRN
Status: COMPLETED | OUTPATIENT
Start: 2020-02-04 | End: 2020-02-04

## 2020-02-04 RX ORDER — SODIUM CHLORIDE, SODIUM LACTATE, POTASSIUM CHLORIDE, CALCIUM CHLORIDE 600; 310; 30; 20 MG/100ML; MG/100ML; MG/100ML; MG/100ML
INJECTION, SOLUTION INTRAVENOUS CONTINUOUS
Status: DISCONTINUED | OUTPATIENT
Start: 2020-02-04 | End: 2020-02-04 | Stop reason: HOSPADM

## 2020-02-04 RX ORDER — DEXAMETHASONE SODIUM PHOSPHATE 4 MG/ML
INJECTION, SOLUTION INTRA-ARTICULAR; INTRALESIONAL; INTRAMUSCULAR; INTRAVENOUS; SOFT TISSUE PRN
Status: DISCONTINUED | OUTPATIENT
Start: 2020-02-04 | End: 2020-02-04 | Stop reason: SDUPTHER

## 2020-02-04 RX ORDER — PROPOFOL 10 MG/ML
INJECTION, EMULSION INTRAVENOUS PRN
Status: DISCONTINUED | OUTPATIENT
Start: 2020-02-04 | End: 2020-02-04 | Stop reason: SDUPTHER

## 2020-02-04 RX ORDER — ONDANSETRON 2 MG/ML
4 INJECTION INTRAMUSCULAR; INTRAVENOUS
Status: COMPLETED | OUTPATIENT
Start: 2020-02-04 | End: 2020-02-04

## 2020-02-04 RX ORDER — HYDRALAZINE HYDROCHLORIDE 20 MG/ML
5 INJECTION INTRAMUSCULAR; INTRAVENOUS EVERY 10 MIN PRN
Status: DISCONTINUED | OUTPATIENT
Start: 2020-02-04 | End: 2020-02-04 | Stop reason: HOSPADM

## 2020-02-04 RX ORDER — 0.9 % SODIUM CHLORIDE 0.9 %
500 INTRAVENOUS SOLUTION INTRAVENOUS
Status: DISCONTINUED | OUTPATIENT
Start: 2020-02-04 | End: 2020-02-04 | Stop reason: HOSPADM

## 2020-02-04 RX ORDER — LIDOCAINE AND PRILOCAINE 25; 25 MG/G; MG/G
CREAM TOPICAL PRN
COMMUNITY

## 2020-02-04 RX ORDER — MEPERIDINE HYDROCHLORIDE 25 MG/ML
12.5 INJECTION INTRAMUSCULAR; INTRAVENOUS; SUBCUTANEOUS EVERY 5 MIN PRN
Status: DISCONTINUED | OUTPATIENT
Start: 2020-02-04 | End: 2020-02-04 | Stop reason: HOSPADM

## 2020-02-04 RX ORDER — DIPHENHYDRAMINE HYDROCHLORIDE 50 MG/ML
12.5 INJECTION INTRAMUSCULAR; INTRAVENOUS
Status: DISCONTINUED | OUTPATIENT
Start: 2020-02-04 | End: 2020-02-04 | Stop reason: HOSPADM

## 2020-02-04 RX ORDER — ONDANSETRON 2 MG/ML
INJECTION INTRAMUSCULAR; INTRAVENOUS PRN
Status: DISCONTINUED | OUTPATIENT
Start: 2020-02-04 | End: 2020-02-04 | Stop reason: SDUPTHER

## 2020-02-04 RX ORDER — PROCHLORPERAZINE EDISYLATE 5 MG/ML
5 INJECTION INTRAMUSCULAR; INTRAVENOUS
Status: COMPLETED | OUTPATIENT
Start: 2020-02-04 | End: 2020-02-04

## 2020-02-04 RX ADMIN — HYDROMORPHONE HYDROCHLORIDE 0.5 MG: 1 INJECTION, SOLUTION INTRAMUSCULAR; INTRAVENOUS; SUBCUTANEOUS at 20:14

## 2020-02-04 RX ADMIN — Medication 0.69 MILLICURIE: at 16:15

## 2020-02-04 RX ADMIN — FENTANYL CITRATE 100 MCG: 50 INJECTION INTRAMUSCULAR; INTRAVENOUS at 17:04

## 2020-02-04 RX ADMIN — HYDROMORPHONE HYDROCHLORIDE 0.5 MG: 1 INJECTION, SOLUTION INTRAMUSCULAR; INTRAVENOUS; SUBCUTANEOUS at 19:52

## 2020-02-04 RX ADMIN — FENTANYL CITRATE 100 MCG: 50 INJECTION INTRAMUSCULAR; INTRAVENOUS at 17:38

## 2020-02-04 RX ADMIN — MIDAZOLAM HYDROCHLORIDE 2 MG: 2 INJECTION, SOLUTION INTRAMUSCULAR; INTRAVENOUS at 15:53

## 2020-02-04 RX ADMIN — Medication 140 MG: at 15:53

## 2020-02-04 RX ADMIN — PROPOFOL 50 MG: 10 INJECTION, EMULSION INTRAVENOUS at 16:19

## 2020-02-04 RX ADMIN — PROPOFOL 200 MG: 10 INJECTION, EMULSION INTRAVENOUS at 15:53

## 2020-02-04 RX ADMIN — FENTANYL CITRATE 50 MCG: 50 INJECTION INTRAMUSCULAR; INTRAVENOUS at 15:53

## 2020-02-04 RX ADMIN — FENTANYL CITRATE 50 MCG: 50 INJECTION INTRAMUSCULAR; INTRAVENOUS at 16:25

## 2020-02-04 RX ADMIN — ROCURONIUM BROMIDE 10 MG: 10 INJECTION, SOLUTION INTRAVENOUS at 15:53

## 2020-02-04 RX ADMIN — FENTANYL CITRATE 50 MCG: 50 INJECTION INTRAMUSCULAR; INTRAVENOUS at 16:39

## 2020-02-04 RX ADMIN — PROPOFOL 50 MG: 10 INJECTION, EMULSION INTRAVENOUS at 16:47

## 2020-02-04 RX ADMIN — FENTANYL CITRATE 50 MCG: 50 INJECTION INTRAMUSCULAR; INTRAVENOUS at 16:00

## 2020-02-04 RX ADMIN — SODIUM CHLORIDE, SODIUM LACTATE, POTASSIUM CHLORIDE, AND CALCIUM CHLORIDE: 600; 310; 30; 20 INJECTION, SOLUTION INTRAVENOUS at 15:28

## 2020-02-04 RX ADMIN — CEFAZOLIN 2 G: 10 INJECTION, POWDER, FOR SOLUTION INTRAVENOUS at 16:01

## 2020-02-04 RX ADMIN — ONDANSETRON 4 MG: 2 INJECTION INTRAMUSCULAR; INTRAVENOUS at 20:24

## 2020-02-04 RX ADMIN — SODIUM CHLORIDE, SODIUM LACTATE, POTASSIUM CHLORIDE, AND CALCIUM CHLORIDE: 600; 310; 30; 20 INJECTION, SOLUTION INTRAVENOUS at 17:22

## 2020-02-04 RX ADMIN — DEXAMETHASONE SODIUM PHOSPHATE 8 MG: 4 INJECTION, SOLUTION INTRAMUSCULAR; INTRAVENOUS at 15:53

## 2020-02-04 RX ADMIN — PROCHLORPERAZINE EDISYLATE 5 MG: 5 INJECTION INTRAMUSCULAR; INTRAVENOUS at 20:35

## 2020-02-04 RX ADMIN — ONDANSETRON 4 MG: 2 INJECTION INTRAMUSCULAR; INTRAVENOUS at 18:00

## 2020-02-04 ASSESSMENT — PULMONARY FUNCTION TESTS
PIF_VALUE: 14
PIF_VALUE: 14
PIF_VALUE: 13
PIF_VALUE: 14
PIF_VALUE: 13
PIF_VALUE: 14
PIF_VALUE: 13
PIF_VALUE: 14
PIF_VALUE: 13
PIF_VALUE: 14
PIF_VALUE: 13
PIF_VALUE: 14
PIF_VALUE: 34
PIF_VALUE: 14
PIF_VALUE: 13
PIF_VALUE: 13
PIF_VALUE: 15
PIF_VALUE: 13
PIF_VALUE: 14
PIF_VALUE: 15
PIF_VALUE: 3
PIF_VALUE: 13
PIF_VALUE: 13
PIF_VALUE: 14
PIF_VALUE: 10
PIF_VALUE: 1
PIF_VALUE: 13
PIF_VALUE: 14
PIF_VALUE: 14
PIF_VALUE: 13
PIF_VALUE: 14
PIF_VALUE: 13
PIF_VALUE: 19
PIF_VALUE: 1
PIF_VALUE: 14
PIF_VALUE: 25
PIF_VALUE: 13
PIF_VALUE: 15
PIF_VALUE: 14
PIF_VALUE: 2
PIF_VALUE: 1
PIF_VALUE: 13
PIF_VALUE: 15
PIF_VALUE: 14
PIF_VALUE: 13
PIF_VALUE: 14
PIF_VALUE: 14
PIF_VALUE: 2
PIF_VALUE: 14
PIF_VALUE: 13
PIF_VALUE: 1
PIF_VALUE: 21
PIF_VALUE: 14
PIF_VALUE: 13
PIF_VALUE: 14
PIF_VALUE: 13
PIF_VALUE: 15
PIF_VALUE: 14
PIF_VALUE: 15
PIF_VALUE: 14
PIF_VALUE: 13
PIF_VALUE: 14
PIF_VALUE: 15
PIF_VALUE: 14
PIF_VALUE: 14
PIF_VALUE: 15
PIF_VALUE: 14
PIF_VALUE: 14
PIF_VALUE: 0
PIF_VALUE: 0
PIF_VALUE: 14
PIF_VALUE: 13
PIF_VALUE: 13
PIF_VALUE: 15
PIF_VALUE: 14
PIF_VALUE: 13
PIF_VALUE: 14
PIF_VALUE: 14
PIF_VALUE: 13
PIF_VALUE: 14
PIF_VALUE: 13
PIF_VALUE: 14
PIF_VALUE: 1
PIF_VALUE: 14
PIF_VALUE: 13
PIF_VALUE: 1
PIF_VALUE: 13
PIF_VALUE: 15
PIF_VALUE: 14
PIF_VALUE: 13
PIF_VALUE: 14
PIF_VALUE: 15
PIF_VALUE: 13
PIF_VALUE: 14
PIF_VALUE: 15
PIF_VALUE: 11
PIF_VALUE: 21
PIF_VALUE: 11
PIF_VALUE: 14
PIF_VALUE: 14
PIF_VALUE: 1
PIF_VALUE: 15
PIF_VALUE: 14
PIF_VALUE: 15
PIF_VALUE: 14
PIF_VALUE: 15
PIF_VALUE: 13
PIF_VALUE: 3
PIF_VALUE: 13
PIF_VALUE: 15
PIF_VALUE: 14
PIF_VALUE: 13
PIF_VALUE: 13
PIF_VALUE: 14
PIF_VALUE: 16
PIF_VALUE: 15
PIF_VALUE: 13
PIF_VALUE: 15
PIF_VALUE: 15
PIF_VALUE: 13
PIF_VALUE: 13
PIF_VALUE: 15
PIF_VALUE: 3
PIF_VALUE: 3
PIF_VALUE: 14
PIF_VALUE: 14
PIF_VALUE: 15

## 2020-02-04 ASSESSMENT — PAIN SCALES - GENERAL
PAINLEVEL_OUTOF10: 2
PAINLEVEL_OUTOF10: 0
PAINLEVEL_OUTOF10: 10
PAINLEVEL_OUTOF10: 5
PAINLEVEL_OUTOF10: 7

## 2020-02-04 ASSESSMENT — PAIN DESCRIPTION - ONSET: ONSET: SUDDEN

## 2020-02-04 ASSESSMENT — PAIN DESCRIPTION - FREQUENCY: FREQUENCY: CONTINUOUS

## 2020-02-04 ASSESSMENT — PAIN DESCRIPTION - PAIN TYPE: TYPE: ACUTE PAIN;SURGICAL PAIN

## 2020-02-04 ASSESSMENT — PAIN DESCRIPTION - LOCATION: LOCATION: BREAST

## 2020-02-04 ASSESSMENT — PAIN DESCRIPTION - ORIENTATION: ORIENTATION: OTHER (COMMENT)

## 2020-02-04 ASSESSMENT — LIFESTYLE VARIABLES: SMOKING_STATUS: 0

## 2020-02-04 ASSESSMENT — PAIN DESCRIPTION - DESCRIPTORS: DESCRIPTORS: BURNING

## 2020-02-04 ASSESSMENT — PAIN - FUNCTIONAL ASSESSMENT: PAIN_FUNCTIONAL_ASSESSMENT: 0-10

## 2020-02-04 NOTE — PROGRESS NOTES
PRE-OP INSTRUCTIONS FOR THE SURGICAL PATIENT YOU ARE UNABLE TO MAKE CONTACT FOR AN INTERVIEW:      1. Follow instructions for your ARRIVAL TIME as DIRECTED BY YOUR SURGEON. 2. Enter the MAIN entrance located on [employer] and report to the desk. 3. Bring your insurance & prescription card and photo ID with you. You may also be asked to pay a co-pay, as you may want to bring a check or credit card with you. 4. Leave all other valuables at home. 5. Arrange for someone to drive you home and be with you for the first 24 hours after discharge. 6. Bring your medication list with you day of surgery with doses and frequency listed  7. You must contact your surgeon for Instructions regarding:         - ALL medication instructions, especially if taking blood thinners, aspirin, or diabetic medication.  - IF  there is a change in your physical condition such as a cold, fever, rash, cuts, sores or any other infection, especially near your surgical site. 8. A Pre-op History and Physical for surgery MUST be completed by your Physician or an Urgent Care within 30 days of your procedure date. Please bring a copy with you on the day of your procedure and along with any other testing performed. 9. DO NOT EAT ANYTHING eight hours prior to surgery. May have up to 8 ounces of water 4 hours prior to surgery. 10. No gum, candy, mints, or ice chips day of procedure. 11. Please refrain from drinking alcohol the day before or day of your procedure. 12. Please do not smoke the day of your procedure. 13. Dress in loose, comfortable clothing appropriate for redressing after your procedure. Do not wear jewelry (including body piercings), make-up, fingernail polish, lotion, powders or metal hairclips. 15. Contacts will need to be removed prior to surgery. You may want to bring your            Eye glasses to wear immediately before and after surgery.   14. Dentures will need to be removed before your
There is a delay in the start time of the procedure.   Patient and family informed of delay by Jean Claude Ann
paralysis. I understand that if I have a Limitation of Treatment order in effect during my hospitalization, the order may or may not be in effect during this procedure. I give my doctor permission to give me blood or blood products. I understand that there are risks with receiving blood such as hepatitis, AIDS, fever, or allergic reaction. I acknowledge that the risks, benefits, and alternatives of this treatment have been explained to me and that no express or implied warranty has been given by the hospital, any blood bank, or any person or entity as to the blood or blood components transfused. At the discretion of my doctor, I agree to allow observers, equipment/product representatives and allow photographing, and/or televising of the procedure, provided my name or identity is maintained confidentially. I agree the hospital may dispose of or use for scientific or educational purposes any tissue, fluid, or body parts which may be removed.     ________________________________Date________Time______ am/pm  ([employer])  Patient or Signature of Closest Relative or Legal Guardian    ________________________________Date________Time______am/pm      Page 1 of  1  Witness

## 2020-02-04 NOTE — H&P
Merly Mckeon    [id number]    [facility]  Department of General Surgery   Surgical Service   Pre-operative History and Physical  Last H & P within the last 30 days. DIAGNOSIS:   MALIGNANT NEOPLASM OF LOWER INNER QUADRANT OF LEFT FEMALE BREAST, ESTROGEN RECEPTOR NEGATIVE    PROCEDURE:  NC MASTECTOMY, PARTIAL [31198] (LEFT LUMPECTOMY WITH SENTINEL NODE BIOPSY, BIOZORB)  NC EXCISE BREAST LES W XRAY MARKER [19125] (LEFT BREAST ULTRASOUND GUIDED SEED LOCALIZATION)  NC REDUCTION OF LARGE BREAST [02200] (BILATERAL ONCOPLASTIC BREAST REDUCTION)      HISTORY OF PRESENT ILLNESS:   Patient with left breast invasive ductal carcinoma has completed viky adjuvant treatment  And presents today for the above procedures.       Past Medical History:        Diagnosis Date    Cancer ([address]     breast    Seizure disorder ([facility])     tonic clonic seizures     Past Surgical History:        Procedure Laterality Date    BREAST SURGERY      bx of breast    DILATION AND CURETTAGE OF UTERUS      HYSTERECTOMY      INSERTION / REMOVAL / REPLACEMENT VENOUS ACCESS CATHETER Right 9/16/2019    RIGHT PORT PLACEMENT performed by Clarisse Xie MD at [address] ARTHROSCOPY Right      Past Social History:  Social History     Socioeconomic History    Marital status:      Spouse name: None    Number of children: None    Years of education: None    Highest education level: None   Occupational History    None   Social Needs    Financial resource strain: None    Food insecurity:     Worry: None     Inability: None    Transportation needs:     Medical: None     Non-medical: None   Tobacco Use    Smoking status: Never Smoker    Smokeless tobacco: Never Used   Substance and Sexual Activity    Alcohol use: Yes     Comment: occas    Drug use: None    Sexual activity: None   Lifestyle    Physical activity:     Days per week: None     Minutes per session: None    Stress: None   Relationships  Social connections:     Talks on phone: None     Gets together: None     Attends Protestant service: None     Active member of club or organization: None     Attends meetings of clubs or organizations: None     Relationship status: None    Intimate partner violence:     Fear of current or ex partner: None     Emotionally abused: None     Physically abused: None     Forced sexual activity: None   Other Topics Concern    None   Social History Narrative    None         Medications Prior to Admission:      Prior to Admission medications    Medication Sig Start Date End Date Taking? Authorizing Provider   lidocaine-prilocaine (EMLA) 2.5-2.5 % cream Apply topically as needed for Pain Apply topically as needed. Yes Historical Provider, MD   diphenoxylate-atropine (LOMOTIL) 2.5-0.025 MG per tablet Take 1 tablet by mouth 4 times daily as needed for Diarrhea. Yes Historical Provider, MD   ondansetron (ZOFRAN) 8 MG tablet Take 8 mg by mouth every 8 hours as needed for Nausea or Vomiting   Yes Historical Provider, MD         Allergies:  Patient has no known allergies. PHYSICAL EXAM:      /75   Pulse 82   Temp 98.1 °F (36.7 °C) (Oral)   Resp 14   Ht 5' 10\" (1.778 m)   Wt 183 lb (83 kg)   LMP 02/04/2015   SpO2 97%   BMI 26.26 kg/m²      Airway:  Airway patent with no audible stridor    Heart:  regular rate and rhythm, No murmur noted    Lungs:  No increased work of breathing, good air exchange, clear to auscultation bilaterally, no crackles or wheezing    Abdomen:  soft, non-distended, non-tender, no rebound tenderness or guarding, normal active bowel sounds and no masses palpated    ASSESSMENT AND PLAN     Patient is a 44 y.o. female with above specified procedure planned. 1.  Patient seen and focused exam done today- no new changes since last physical exam on 1/30/20    2. Access to ancillary services are available per request of the provider.     KAREEM Reynolds - CNP     2/4/2020

## 2020-02-04 NOTE — ANESTHESIA PRE PROCEDURE
Department of Anesthesiology  Preprocedure Note       Name:  Cyn Cortes   Age:  44 y.o.  :  1970                                          MRN:  [id number]         Date:  2020      Surgeon: Jessica Mcuhgh):  MD Jaiden Santacruz MD    Procedure: LEFT LUMPECTOMY WITH SENTINEL NODE BIOPSY, BIOZORB (Left )  LEFT BREAST ULTRASOUND GUIDED SEED LOCALIZATION (Left )  BILATERAL ONCOPLASTIC BREAST REDUCTION (Bilateral )    Medications prior to admission:   Prior to Admission medications    Not on File       Current medications:    Current Facility-Administered Medications   Medication Dose Route Frequency Provider Last Rate Last Dose    ceFAZolin (ANCEF) 2 g in dextrose 5 % 50 mL IVPB  2 g Intravenous Once Dom Maxwell MD        lactated ringers infusion   Intravenous Continuous Reema Burkett MD        HYDROmorphone (DILAUDID) injection 0.25 mg  0.25 mg Intravenous Q5 Min PRN Shaina Crea, DO        HYDROmorphone (DILAUDID) injection 0.5 mg  0.5 mg Intravenous Q5 Min PRN Shaina Crea, DO        HYDROcodone-acetaminophen (NORCO) 5-325 MG per tablet 1 tablet  1 tablet Oral Once PRN Shaina Crea, DO        diphenhydrAMINE (BENADRYL) injection 12.5 mg  12.5 mg Intravenous Once PRN Shaina Crea, DO        0.9 % sodium chloride bolus  500 mL Intravenous Once PRN Shaina Crea, DO        [facility] injection 4 mg  4 mg Intravenous Once PRN Shaina Crea, DO        prochlorperazine (COMPAZINE) injection 5 mg  5 mg Intravenous Once PRN Shaina Crea, DO        hydrALAZINE (APRESOLINE) injection 5 mg  5 mg Intravenous Q10 Min PRN Shaina Crea, DO        meperidine (DEMEROL) injection 12.5 mg  12.5 mg Intravenous Q5 Min PRN Shaina Crea, DO           Allergies:  No Known Allergies    Problem List:    Patient Active Problem List   Diagnosis Code    Poor venous access I87.8    Malignant neoplasm of lower-inner quadrant of left breast in female, PROTIME, INR, APTT    HCG (If Applicable): No results found for: PREGTESTUR, PREGSERUM, HCG, HCGQUANT     ABGs: No results found for: PHART, PO2ART, EPG3MNX, SAW9CNR, BEART, V2GXMXDQ     Type & Screen (If Applicable):  No results found for: LABABO, [address]    Anesthesia Evaluation  Patient summary reviewed and Nursing notes reviewed no history of anesthetic complications:   Airway: Mallampati: I  TM distance: >3 FB   Neck ROM: full  Mouth opening: > = 3 FB Dental: normal exam         Pulmonary: breath sounds clear to auscultation      (-) not a current smoker                           Cardiovascular:  Exercise tolerance: good (>4 METS),       (-) past MI    NYHA Classification: I    Rhythm: regular  Rate: normal           Beta Blocker:  Not on Beta Blocker         Neuro/Psych:   (+) seizures (remote hx 2003  pseudo sz from [facility]  no meds ):,             GI/Hepatic/Renal:        (-) GERD       Endo/Other:    (+) malignancy/cancer (left breast CA   has completed 6 rounds of chemo last dose mid January   right arm swollen improving   no dvt per pt ). Abdominal:           Vascular:                                        Anesthesia Plan      general     ASA 3       Induction: intravenous. MIPS: Postoperative opioids intended and Prophylactic antiemetics administered. Anesthetic plan and risks discussed with patient and child/children. Plan discussed with CRNA.     Attending anesthesiologist reviewed and agrees with DO Ana Laura   2/4/2020

## 2020-02-04 NOTE — H&P
The patient was identified and examined. The surgical site was marked radioactive seed localization. No interval changes in health status since H&P was performed. The patient is cleared to proceed as scheduled.

## 2020-02-05 NOTE — ANESTHESIA POSTPROCEDURE EVALUATION
Department of Anesthesiology  Postprocedure Note    Patient: Tiago Hatch  MRN: [id number]  YOB: 1970  Date of evaluation: 2/4/2020  Time:  7:05 PM     Procedure Summary     Date:  02/04/20 Room / Location:  [facility] [address] / [facility]    Anesthesia Start:  5534 Anesthesia Stop:  1834    Procedures:       LEFT LUMPECTOMY WITH SENTINEL NODE BIOPSY, BIOZORB PLACEMENT (Left )      LEFT BREAST ULTRASOUND GUIDED SEED LOCALIZATION (Left )      BILATERAL ONCOPLASTIC BREAST REDUCTION (Bilateral ) Diagnosis:  (MALIGNANT NEOPLASM OF LOWER INNER QUADRANT OF LEFT FEMALE BREAST, ESTROGEN RECEPTOR NEGATIVE)    Surgeon:  Tuyet Rodriguez MD; Mahnaz Kramer MD Responsible Provider:  Santos Ames MD    Anesthesia Type:  general ASA Status:  3          Anesthesia Type: general    James Phase I: James Score: 8    James Phase II:      Last vitals: Reviewed and per EMR flowsheets.        Anesthesia Post Evaluation    Patient location during evaluation: PACU  Patient participation: complete - patient participated  Level of consciousness: awake and alert  Pain score: 0  Airway patency: patent  Nausea & Vomiting: no nausea and no vomiting  Complications: no  Cardiovascular status: hemodynamically stable  Respiratory status: acceptable  Hydration status: euvolemic

## 2020-02-05 NOTE — FLOWSHEET NOTE
Sitting up, taking water and crackers with no issue.
Son took Rx to [facility] to fill
Taking ice chips and sips of water with no nausea.
Clear bilaterally, pupils equal, round and reactive to light.

## 2020-02-06 NOTE — OP NOTE
incision over the radioactive hotspot. We identified  a blue afferent lymphatic channel and traced this to deep level 1 node  which was blue and radioactive with a count of 3129. Frozen section was  negative for malignancy. [place] node #2 was also identified in deep  level 1 with a count of 18,151. The node was not blue and frozen  section was deferred due to the small size of the node. [place] node  #3 was identified in level 2 with a count of 520. The node was not  blue. This was once again submitted for permanent section due to the  small size of the node. [place] node #4 was identified in level 1. It  was blue and not radioactive. It was also submitted in formalin for  permanent section and finally identified sentinel node #5, level 2, not  blue, with a count of 3295. Frozen section was deferred due to the  small size of the node. Background in the axilla was 464. At that  point, we concluded the sentinel node biopsy. The wound was temporarily  closed using staples. We then turned our attention to the inferomedial  left breast at the site of the radioactive seed. We created a  triangular incision over the radioactive hotspot as directed by Dr. Conchis Harrison. The incision was taken down to and including the pectoralis fascia. Once we had completed dissecting the specimen, the margins were oriented using a margin map. We submitted  the lumpectomy specimen for radiograph which confirmed the presence of  both the Ultracor clip and the radioactive seed within the specimen. No additional margins were obtained. A 2 x 2 low profile Biozorb marker was tacked to the lumpectomy site for radiation therapy planning. Care of the patient was then  transferred to Dr. Tanja Wallis for completion of reduction  mammoplasty. The surface count of the specimen was 52,018 and this was  the posterior margin. The surgical bed count was 10 and background  count was 313.       Care of the patient was then
Vicryl and Monocryl sutures. After all incisions had been  closed, they were reinforced with the Prineo wound closure system. The  incision in the axilla for the lymph node removal was also closed with  Vicryl and Monocryl sutures. Next, a bulky layer of gauze was then  placed, held with a surgical bra. Of note, a round #10 drain was placed  in the axilla and secured with a silk suture. The patient tolerated the  procedure well. On the left side including the lumpectomy specimen, the  total gm of tissue removed was 110 gm.         Steffi Collier MD    D: 02/04/2020 18:34:51       T: 02/05/2020 3:02:50     [email or web address]  Job#: [id number]     Doc#: [id number]    CC:

## 2020-02-18 ENCOUNTER — HOSPITAL ENCOUNTER (OUTPATIENT)
Dept: CT IMAGING | Age: 50
Discharge: HOME | End: 2020-02-18
Payer: COMMERCIAL

## 2020-02-18 PROCEDURE — 6360000004 HC RX CONTRAST MEDICATION: Performed by: SURGERY

## 2020-02-18 PROCEDURE — 6360000002 HC RX W HCPCS: Performed by: RADIOLOGY

## 2020-02-18 PROCEDURE — 71260 CT THORAX DX C+: CPT

## 2020-02-18 RX ORDER — HEPARIN SODIUM (PORCINE) LOCK FLUSH IV SOLN 100 UNIT/ML 100 UNIT/ML
100 SOLUTION INTRAVENOUS ONCE
Status: COMPLETED | OUTPATIENT
Start: 2020-02-18 | End: 2020-02-18

## 2020-02-18 RX ADMIN — Medication 100 UNITS: at 10:30

## 2020-02-18 RX ADMIN — IOPAMIDOL 130 ML: 755 INJECTION, SOLUTION INTRAVENOUS at 10:56

## 2020-02-28 ENCOUNTER — HOSPITAL ENCOUNTER (OUTPATIENT)
Dept: NON INVASIVE DIAGNOSTICS | Age: 50
Discharge: HOME | End: 2020-02-28
Payer: COMMERCIAL

## 2020-02-28 LAB
LV EF: 58 %
LVEF MODALITY: NORMAL

## 2020-02-28 PROCEDURE — 93306 TTE W/DOPPLER COMPLETE: CPT

## 2020-03-13 ENCOUNTER — HOSPITAL ENCOUNTER (OUTPATIENT)
Dept: PHYSICAL THERAPY | Age: 50
Setting detail: THERAPIES SERIES
Discharge: HOME | End: 2020-03-13
Payer: COMMERCIAL

## 2020-03-13 PROCEDURE — 97110 THERAPEUTIC EXERCISES: CPT

## 2020-03-13 PROCEDURE — 97530 THERAPEUTIC ACTIVITIES: CPT

## 2020-03-13 PROCEDURE — 97161 PT EVAL LOW COMPLEX 20 MIN: CPT

## 2020-03-13 NOTE — PROGRESS NOTES
PHYSICAL THERAPY  LYMPHEDEMA INITIAL EVALUATION    Date:  3/13/2020  Patient Name:  Justin Leon      :  1970   MRN: [id number]  Restrictions/Precautions:  Hx L breast CA, seizures, No fall risk  Diagnosis:  R upper extremity lymphedema I97.2, Malignant neoplasm of lower-inner quadrant of left female breast C50.312  Treatment Diagnosis:  Lymphedema R UE, cording L UE s/p L lumpectomy and B reconstruction  Insurance/Certification information:  [facility], 30 visits  Referring Physician/Referral Date: Dr. Laurel Linares, 2020     Chart reviewed and pt assessed for rehab services. Visit #1 of 10   SUBJECTIVE   Onset date: swelling/pain mid  R UE, cording 2 days after 2020 surgery  Course of tx: Pt diagnosed with L breast CA Aug 2019. Had chemo starting in Sept, every 3 weeks, lumpectomy L breast and ~ 4 L axillary lymph nodes removed 2020 with B reconstruction. Pt started immunotherapy after surgery and has had 2/6 treatments thus far. To begin radiation L breast 2020. Pt started to notice swelling R UE mid  weekend after access needle was kept in port (Monday) for fluids later in the week, removed the day had fluids (Friday). Is gradually getting better. Doppler R UE:   No evidence of deep vein or superficial venous thrombosis of the right upper    extremity. Chest CT: no acute abnormality or gross evidence of metastatic disease is identified. Noted cording L axilla 2 days after surgery which has improved some. Pt hasn't had any treatment for either of these issues yet. No infections reported. Does note some improper fit of clothing.         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

## 2020-03-13 NOTE — PROGRESS NOTES
The Upper Extremity Functional Index    Patient: Jonatan Mcdonald  : 1970  MRN: [id number]  Date: 3/13/2020  Electronically Signed by: Shiloh Monterroso PT, DPT [id number]     We are interested in knowing whether you are having any difficulty at all with the activities listed below because of your upper limb problem for which you are currently seeking attention. Please provide an answer for each activity.          Today do you or would you have difficulty at all with:    Activities Extreme Difficulty or Unable to Perform Activity Quite a Bit of Difficulty Moderate Difficulty A Little Bit of Difficulty No Difficulty   1 Any of your usual work, housework, or school activities []0 []1 []2  []3 [x]4   2 Your usual hobbies, recreational, or sporting activities []0 [x]1 []2 []3 []4   3 Lifting a bag of groceries to waist level []0 [x]1 []2 []3 []4   4 Lifting a bag of groceries above your head []0 [x]1 []2 []3 []4   5 Grooming your hair []0 []1 []2 [x]3 []4   6 Pushing up on your hands (eg from bathtub/chair) []0 []1 []2 [x]3 []4   7 Preparing food (eg peeling, cutting) []0 []1 []2 []3 [x]4   8 Driving []0 []1 []2 [x]3 []4   9 Vacuuming, sweeping, or raking []0 []1 [x]2 []3 []4   10 Dressing []0 []1 []2 [x]3 []4   11 Doing up buttons [x]0 []1 []2 []3 []4   12 Using tools or appliances []0 []1 []2 []3 [x]4   13 Opening doors []0 []1 []2 []3 [x]4   14 Cleaning []0 []1 []2 [x]3 []4   15 Tying or lacing shoes []0 []1 []2 [x]3 []4   16 Sleeping []0 []1 [x]2 []3 []4   17 Laundering clothes (eg washing, ironing, folding)  []0 []1 []2 [x]3 []4   18 Opening a jar []0 [x]1 []2 []3 []4   19 Throwing a ball []0 []1 [x]2 []3 []4   20 Carrying a small suitcase with your affected limb []0 []1 [x]2 []3 []4    Column Totals:          Patient Score from Above: 49/80    (Patient Score / 80) X 100:  61%      Scoring Method for UEFI    Scoring:   UEFI Score = (Sum of Responses / 80) X 100    Error + / - 5 points, Minimum Level of

## 2020-03-13 NOTE — FLOWSHEET NOTE
Physical Therapy Daily Treatment Note  Date:  3/13/2020    Patient Name:  Wilma Jamison    :  1970  MRN: [id number]  Restrictions/Precautions:  Hx L breast CA, seizures      Medical/Treatment Diagnosis Information:  ·   R upper extremity lymphedema I97.2, Malignant neoplasm of lower-inner quadrant of left female breast C50.312  ·   Lymphedema R UE, cording L UE s/p L lumpectomy and B reconstruction  Insurance/Certification information:    [facility] [place], 27 visits  Physician Information:    Dr. Javier Cabrales MD Follow-up Visit: radiation 3/23  Plan of care signed (Y/N):  Sent to Dr via [facility]  Visit# / total visits:  1/10  Pain level: 3/10     Progress Note Due (10 visits or 30 days, whichever is less):    Recertification Note Due (End of POC or 90 days, whichever is less):      Subjective:  2020: Onset date: swelling/pain mid  R UE, cording 2 days after 2020 surgery  Course of tx: Pt diagnosed with L breast CA Aug 2019. Had chemo starting in Sept, every 3 weeks, lumpectomy L breast and ~ 4 L axillary lymph nodes removed 2020 with B reconstruction. Pt started immunotherapy after surgery and has had 2/6 treatments thus far. To begin radiation L breast 2020. Pt started to notice swelling R UE mid  weekend after access needle was kept in port (Monday) for fluids later in the week, removed the day had fluids (Friday). Is gradually getting better. Doppler R UE:   No evidence of deep vein or superficial venous thrombosis of the right upper    extremity. Chest CT: no acute abnormality or gross evidence of metastatic disease is identified. Noted cording L axilla 2 days after surgery which has improved some. Pt hasn't had any treatment for either of these issues yet. No infections reported. Does note some improper fit of clothing.         Pain Level, Description, Location: neuropathy in fingertips/tingling/shock B hands which started with chemo treatments, Pain 3/10 R upper arm, elbow, and anterior forearm (pain is improving), no pain L UE  Aggravating factors: lifting, carrying, reaching, pain/swelling worse in the AM  Alleviating factors: as the day progresses swelling and pain improve           Objective:03/13/2020   Observation:    Pitting (0-no pitting; 1+ tissue return to normal immediately; 2+ tissue return 15-30 seconds; 3+ tissue return 1-1.5 mins; 4+ tissue return 2-3 mins; N/A - indurated):  1+ R upper arm and forearm     Test measurements:     UE AROM   · Able to fully fist and oppose thumb to each finger B.  · R wrist limited 25% globally with tightness vs L WFLs  · R elbow  deg with tightness, L elbow 0-150 deg  · R shoulder flexion 100 deg, L 115 deg with tightness with each  · R shoulder abd 75 deg, L 90 deg with tightness with each  · B shoulder ER reach C7 with tightness with each  · B shoulder IR reach T10 with tightness with each  UE Strength:   Shoulder deferred due to limited ROM  B elbow flexion 4-/5  B wrists 5/5  B  strength with moderate weakness in each    GIRTH MEASUREMENT FLOW SHEET:     R Upper Extremity  (cm) @ IE L Upper Extremity  (cm) @ IE    2nd finger Cuticle  5.3 5.2   MC heads 19.6 19.5   Ulnar styloid process  16.2 16.0   Mid-forearm (8 cm proximal to ulnar styloid) 19.7 18.2   Mid-forearm ( 20 cm proximal to ulnar styloid) 27.8 25.9   Elbow crease 26.5 25.5   Mid upper arm (10 cm proximal to elbow crease) 31.4 29.1   Axilla 31.1 30.0         Exercises, Neuro Facilitation & Gait Training (05874, [id number], [id number]): Activity Resistance/Repetitions Other comments   AROM elbows/wrist 10-15 x each    Squeeze putty Orange x 30\" -1 min each    Table slides flexion 5\" x 5-10 each                               Therapeutic Activities (96439): Encouraged sleeping on back. Reviewed and dispensed handouts on s/s of infection and risk reduction practices. Explained lymphedema causes and plan for treatment. Pt verbalized good understanding. N lifting/carrying without increased pain for laundry and groceries. Plan:   [] Continue per plan of care [] Alter current plan (see comments)  [x] Plan of care initiated [] Hold pending MD visit [] Discharge    Plan for Next Session:  Review HEP, add MLD, 3-D cording manual    Electronically signed by:   Apollo Hogue DPT [id number]

## 2020-03-13 NOTE — PLAN OF CARE
Outpatient Physical Therapy  Phone: [phone] Fax: [phone]    To:   Dr. Kendy Ruff   From: Carlos Cruz, PT, DPT [id number]   Date: 3/13/2020  Patient: Griselda Ojeda     : 1970 MRN: [id number]  Diagnosis:   R upper extremity lymphedema I97.2, Malignant neoplasm of lower-inner quadrant of left female breast C50.312   Treatment Diagnosis:   Lymphedema R UE, cording L UE s/p L lumpectomy and B reconstruction    Physical Therapy Certification/Re-Certification Form  Dear Dr. Dannie Velazco,   The following patient has been evaluated for physical therapy services and for therapy to continue, Medicare requires monthly physician review of the treatment plan. Please review the attached evaluation and/or summary of the patient's plan of care, and verify that you agree therapy should continue by signing the attached document and sending it back to our office.     Plan of Care/Treatment to date:  [x] Therapeutic Exercise (Review/Progress HEP and provide verbal/tactile cueing for activities related to strengthening, flexibility,  endurance, ROM.)       [x] Therapeutic Activity (Provide verbal/tactile cueing for dynamic activities to promote functional tasks.)          [] Gait Training (Provide verbal/tactile/visual cueing for facilitation of normalized gait pattern without or with the least restrictive AD to decrease pain and/or risk for falling.)          [] Neuromuscular Re-education (Review/Progress HEP and provide verbal/tactile cueing for activities related to improving balance, coordination, kinesthetic sense, posture, motor skill, proprioception.)         [x] Manual Therapy (Provide manual therapy to mobilize soft tissue/joints for the purpose of modulating pain, promoting relaxation, increasing ROM, reducing/eliminating soft tissue swelling/inflammation/tightness, improving soft tissue extensibility)   [] Dry Needling    [] Aquatic Therapy (Facilitate muscle relaxation and increases peripheral circulation; stimulates body awareness, balance, and trunk stability.)                  [] Modalities (For modulating pain/tenderness/paresthesias, reducing swelling/inflammation/tightness, improving soft tissue extensibility, and/or to increase muscle tone/strength):     [] Ultrasound  [] Electrical Stimulation        [] Cervical Traction [] Lumbar Traction       [] Cold/hotpack [] Iontophoresis   Other:      []          []      Assessment:    Pt presents with pain and swelling R UE, cording L axilla, decreased ROM/strength B UE limiting N reaching, lifting/carrying, sleep, AM tolerance    Goals:    Short term goals (to be met in 1 weeks)   · Short term goal 1: Pt will verbalize at least 3 ways to reduce risk of lymphedema   · Short term goal 2: Pt will verbalize at least 3 signs/symptoms of infection     Long term goals (to be met in 5 weeks)   · Pt goal: \"complete range and motion\"  · Long term goal 1: Pt will verbalize good understanding of techniques (HEP and MLD) to perform if lymphedema is exacerbated in the future. · Long term goal 2: Increased ROM B shoulders and R elbow/wrist WFLs and no cording L axilla to allow for N reaching with pain no greater than 1/10. · Long term goal 3: Decreased impairment per UEFI <10% impaired. · Long term goal 4: Pt will decrease girth at each position on R UE so within 0.5 cm of L for N fit of clothing. · Long term goal 5: Decrease pitting from 1+ to 0 R UE for decreased risk of infection. · Long term goal 6: B elbow/shoulder strength 5/5 with good  strength to allow for N lifting/carrying without increased pain for laundry and groceries. Frequency/Duration:  # Days per week: [] 1 day # Weeks: [] 1 week [x] 5 weeks     [x] 2 days   [] 2 weeks [] 6 weeks     [] 3 days   [] 3 weeks [] 7 weeks     [] 4 days   [] 4 weeks [] 8 weeks    Rehab Potential: [] Excellent [x] Good [] Fair  [] Poor       Electronically signed by:   Colleen Mccoy, PT, DPT [id number]        If you have any questions or concerns, please don't hesitate to call.   Thank you for your referral.      Physician Signature:________________________________Date:__________________  By signing above, therapists plan is approved by physician

## 2020-03-18 ENCOUNTER — APPOINTMENT (OUTPATIENT)
Dept: PHYSICAL THERAPY | Age: 50
End: 2020-03-18
Payer: COMMERCIAL

## 2020-03-23 ENCOUNTER — HOSPITAL ENCOUNTER (OUTPATIENT)
Dept: PHYSICAL THERAPY | Age: 50
Setting detail: THERAPIES SERIES
Discharge: HOME | End: 2020-03-23
Payer: COMMERCIAL

## 2020-03-23 PROCEDURE — 97140 MANUAL THERAPY 1/> REGIONS: CPT

## 2020-03-23 NOTE — FLOWSHEET NOTE
elbow, and anterior forearm (pain is improving), no pain L UE  Aggravating factors: lifting, carrying, reaching, pain/swelling worse in the AM  Alleviating factors: as the day progresses swelling and pain improve    03/23/2020: Pt reports she feels better already since IE: feels more ROM in both arms. Only complaints of tightness this AM, no pain. No questions from handouts given at IE. HEP going well. Objective:03/13/2020   Observation:    Pitting (0-no pitting; 1+ tissue return to normal immediately; 2+ tissue return 15-30 seconds; 3+ tissue return 1-1.5 mins; 4+ tissue return 2-3 mins; N/A - indurated):  1+ R upper arm and forearm     Test measurements:     UE AROM   · Able to fully fist and oppose thumb to each finger B.  · R wrist limited 25% globally with tightness vs L WFLs  · R elbow  deg with tightness, L elbow 0-150 deg  · R shoulder flexion 100 deg, L 115 deg with tightness with each  · R shoulder abd 75 deg, L 90 deg with tightness with each  · B shoulder ER reach C7 with tightness with each  · B shoulder IR reach T10 with tightness with each  UE Strength:   Shoulder deferred due to limited ROM  B elbow flexion 4-/5  B wrists 5/5  B  strength with moderate weakness in each    GIRTH MEASUREMENT FLOW SHEET:     R Upper Extremity  (cm) @ IE L Upper Extremity  (cm) @ IE    2nd finger Cuticle  5.3 5.2   MC heads 19.6 19.5   Ulnar styloid process  16.2 16.0   Mid-forearm (8 cm proximal to ulnar styloid) 19.7 18.2   Mid-forearm ( 20 cm proximal to ulnar styloid) 27.8 25.9   Elbow crease 26.5 25.5   Mid upper arm (10 cm proximal to elbow crease) 31.4 29.1   Axilla 31.1 30.0         Exercises, Neuro Facilitation & Gait Training (53336, [id number], [id number]):   Activity Resistance/Repetitions Other comments   AROM elbows/wrist 10-15 x each Verbal review   Squeeze putty Orange x 30\" -1 min each Verbal review   Table slides flexion 5\" x 5-10 each Verbal review Therapeutic Activities (02142):        Home Exercise Program:   Pt. demonstrated good understanding and knowledge of HEP. Written instructions provided. 03/13/2020: AROM elbows/wrists, putty squeeze, table slides  03/23/2020: encouraged caution with L table glide stretch flexion since is beginning radiation today     Manual Treatments (27107):  (-) Quadrant test  MLD (neck, abdomen, ant/post trunk via axillo-inguinal pathway B, B UE)  Avoid axillo-axilla pathway due to lymph nodes removed L with good tolerance  3-D manual for cording L axilla with good tolerance  Caution L breast when start radiation 3/23/2020    Modalities:  NA    Timed Code Treatment Minutes:  MT: 55, TE: 5    Total Treatment Minutes:  60    Treatment/Activity Tolerance:  [x] Patient tolerated treatment well [] Patient limited by fatigue  [] Patient limited by pain  [] Patient limited by other medical complications  [] Other:     Assessment: good tolerance with initiation of MLD and 3-D manual for cording L axilla    Prognosis: [x] Good [] Fair  [] Poor    Patient Requires Follow-up: [x] Yes  [] No    Goals:    Short term goals (to be met in 1 weeks)   · Short term goal 1: Pt will verbalize at least 3 ways to reduce risk of lymphedema   · Short term goal 2: Pt will verbalize at least 3 signs/symptoms of infection     Long term goals (to be met in 5 weeks)   · Pt goal: \"complete range and motion\"  · Long term goal 1: Pt will verbalize good understanding of techniques (HEP and MLD) to perform if lymphedema is exacerbated in the future. · Long term goal 2: Increased ROM B shoulders and R elbow/wrist WFLs and no cording L axilla to allow for N reaching with pain no greater than 1/10. · Long term goal 3: Decreased impairment per UEFI <10% impaired. · Long term goal 4: Pt will decrease girth at each position on R UE so within 0.5 cm of L for N fit of clothing.   · Long term goal 5: Decrease pitting from 1+ to 0 R UE for decreased risk of infection. · Long term goal 6: B elbow/shoulder strength 5/5 with good  strength to allow for N lifting/carrying without increased pain for laundry and groceries. Plan:   [x] Continue per plan of care [] Alter current plan (see comments)  [] Plan of care initiated [] Hold pending MD visit [] Discharge    Plan for Next Session:  Review HEP, continue MLD, 3-D cording manual    Electronically signed by:   Jamel Osei DPT [id number]

## 2020-03-25 ENCOUNTER — HOSPITAL ENCOUNTER (OUTPATIENT)
Dept: PHYSICAL THERAPY | Age: 50
Setting detail: THERAPIES SERIES
Discharge: HOME | End: 2020-03-25
Payer: COMMERCIAL

## 2020-03-25 PROCEDURE — 97140 MANUAL THERAPY 1/> REGIONS: CPT

## 2020-03-25 NOTE — FLOWSHEET NOTE
Physical Therapy Daily Treatment Note  Date:  3/25/2020    Patient Name:  Juliana Hwang    :  1970  MRN: [id number]  Restrictions/Precautions:  Hx L breast CA, seizures      Medical/Treatment Diagnosis Information:  ·   R upper extremity lymphedema I97.2, Malignant neoplasm of lower-inner quadrant of left female breast C50.312  ·   Lymphedema R UE, cording L UE s/p L lumpectomy and B reconstruction  Insurance/Certification information:    [facility], 30 visits  Physician Information:    Dr. Claudia Shirley MD Follow-up Visit: radiation 3/23  Plan of care signed (Y/N):  Y  Visit# / total visits:  3/10  Pain level: 0/10     Progress Note Due (10 visits or 30 days, whichever is less):    Recertification Note Due (End of POC or 90 days, whichever is less):      Subjective:  2020: Onset date: swelling/pain mid  R UE, cording 2 days after 2020 surgery  Course of tx: Pt diagnosed with L breast CA Aug 2019. Had chemo starting in Sept, every 3 weeks, lumpectomy L breast and ~ 4 L axillary lymph nodes removed 2020 with B reconstruction. Pt started immunotherapy after surgery and has had 2/6 treatments thus far. To begin radiation L breast 2020. Pt started to notice swelling R UE mid  weekend after access needle was kept in port (Monday) for fluids later in the week, removed the day had fluids (Friday). Is gradually getting better. Doppler R UE:   No evidence of deep vein or superficial venous thrombosis of the right upper    extremity. Chest CT: no acute abnormality or gross evidence of metastatic disease is identified. Noted cording L axilla 2 days after surgery which has improved some. Pt hasn't had any treatment for either of these issues yet. No infections reported. Does note some improper fit of clothing.         Pain Level, Description, Location: neuropathy in fingertips/tingling/shock B hands which started with chemo treatments, Pain 3/10 R upper arm, Resistance/Repetitions Other comments                              Therapeutic Activities (71085):        Home Exercise Program:   Pt. demonstrated good understanding and knowledge of HEP. Written instructions provided. 03/13/2020: AROM elbows/wrists, putty squeeze, table slides  03/23/2020: encouraged caution with L table glide stretch flexion since is beginning radiation today     Manual Treatments (05492):    MLD (neck, abdomen, ant/post trunk via axillo-inguinal pathway B, B UE)  Avoid axillo-axilla pathway due to lymph nodes removed L with good tolerance  3-D manual for cording L axilla with good tolerance  Caution L breast when start radiation 3/23/2020    Modalities:  NA    Timed Code Treatment Minutes:  MT: 55    Total Treatment Minutes:  55    Treatment/Activity Tolerance:  [x] Patient tolerated treatment well [] Patient limited by fatigue  [] Patient limited by pain  [] Patient limited by other medical complications  [] Other:     Assessment: improving girth R UE noted    Prognosis: [x] Good [] Fair  [] Poor    Patient Requires Follow-up: [x] Yes  [] No    Goals:    Short term goals (to be met in 1 weeks)   · Short term goal 1: Pt will verbalize at least 3 ways to reduce risk of lymphedema   · Short term goal 2: Pt will verbalize at least 3 signs/symptoms of infection     Long term goals (to be met in 5 weeks)   · Pt goal: \"complete range and motion\"  · Long term goal 1: Pt will verbalize good understanding of techniques (HEP and MLD) to perform if lymphedema is exacerbated in the future. · Long term goal 2: Increased ROM B shoulders and R elbow/wrist WFLs and no cording L axilla to allow for N reaching with pain no greater than 1/10. · Long term goal 3: Decreased impairment per UEFI <10% impaired. · Long term goal 4: Pt will decrease girth at each position on R UE so within 0.5 cm of L for N fit of clothing.   · Long term goal 5: Decrease pitting from 1+ to 0 R UE for decreased risk of

## 2020-04-01 ENCOUNTER — HOSPITAL ENCOUNTER (OUTPATIENT)
Dept: PHYSICAL THERAPY | Age: 50
Setting detail: THERAPIES SERIES
Discharge: HOME | End: 2020-04-01
Payer: COMMERCIAL

## 2020-04-01 PROCEDURE — 97140 MANUAL THERAPY 1/> REGIONS: CPT

## 2020-04-01 NOTE — FLOWSHEET NOTE
and anterior forearm (pain is improving), no pain L UE  Aggravating factors: lifting, carrying, reaching, pain/swelling worse in the AM  Alleviating factors: as the day progresses swelling and pain improve    03/23/2020: Pt reports she feels better already since IE: feels more ROM in both arms. Only complaints of tightness this AM, no pain. No questions from handouts given at IE. HEP going well.      03/25/2020: Pt reports overall less heaviness in R arm and thinks she can feel more of the muscle than the swelling when touching arm. 04/01/2020:  Tightness has not really been there at all, able to raise hands over head more for reaching. Also not so much heaviness.                Objective:03/13/2020   Observation:    Pitting (0-no pitting; 1+ tissue return to normal immediately; 2+ tissue return 15-30 seconds; 3+ tissue return 1-1.5 mins; 4+ tissue return 2-3 mins; N/A - indurated):  1+ R upper arm and forearm     Test measurements:     UE AROM   · Able to fully fist and oppose thumb to each finger B.  · R wrist limited 25% globally with tightness vs L WFLs  · R elbow  deg with tightness, L elbow 0-150 deg  · R shoulder flexion 100 deg, L 115 deg with tightness with each  · R shoulder abd 75 deg, L 90 deg with tightness with each  · B shoulder ER reach C7 with tightness with each  · B shoulder IR reach T10 with tightness with each  UE Strength:   Shoulder deferred due to limited ROM  B elbow flexion 4-/5  B wrists 5/5  B  strength with moderate weakness in each    04/01/2020: AROM B shoulders 145 deg flexion, B shoulder abd 150 deg, R elbow 5-150 deg    GIRTH MEASUREMENT FLOW SHEET:     R upper extremity  (cm) 03/25/2020 R Upper Extremity  (cm) @ IE L Upper Extremity  (cm) @ IE    2nd finger Cuticle  5.3 5.3 5.2   MC heads 19.3 19.6 19.5   Ulnar styloid process  16.2 16.2 16.0   Mid-forearm (8 cm proximal to ulnar styloid) 19.7 19.7 18.2   Mid-forearm ( 20 cm proximal to ulnar styloid) 27.5 27.8

## 2020-04-02 ENCOUNTER — HOSPITAL ENCOUNTER (OUTPATIENT)
Dept: PHYSICAL THERAPY | Age: 50
Setting detail: THERAPIES SERIES
End: 2020-04-02
Payer: COMMERCIAL

## 2020-04-06 ENCOUNTER — APPOINTMENT (OUTPATIENT)
Dept: PHYSICAL THERAPY | Age: 50
End: 2020-04-06
Payer: COMMERCIAL

## 2020-04-08 ENCOUNTER — APPOINTMENT (OUTPATIENT)
Dept: PHYSICAL THERAPY | Age: 50
End: 2020-04-08
Payer: COMMERCIAL

## 2020-04-08 ENCOUNTER — HOSPITAL ENCOUNTER (OUTPATIENT)
Dept: PHYSICAL THERAPY | Age: 50
Setting detail: THERAPIES SERIES
Discharge: HOME | End: 2020-04-08
Payer: COMMERCIAL

## 2020-04-08 PROCEDURE — 97140 MANUAL THERAPY 1/> REGIONS: CPT

## 2020-04-08 NOTE — FLOWSHEET NOTE
and anterior forearm (pain is improving), no pain L UE  Aggravating factors: lifting, carrying, reaching, pain/swelling worse in the AM  Alleviating factors: as the day progresses swelling and pain improve    03/23/2020: Pt reports she feels better already since IE: feels more ROM in both arms. Only complaints of tightness this AM, no pain. No questions from handouts given at IE. HEP going well.      03/25/2020: Pt reports overall less heaviness in R arm and thinks she can feel more of the muscle than the swelling when touching arm. 04/01/2020:  Tightness has not really been there at all, able to raise hands over head more for reaching. Also not so much heaviness. 04/08/2020: Each time it gets better, not as much pulling, wrist motion is better. Good tolerance to hands behind head stretch given last visit.              Objective:03/13/2020   Observation:    Pitting (0-no pitting; 1+ tissue return to normal immediately; 2+ tissue return 15-30 seconds; 3+ tissue return 1-1.5 mins; 4+ tissue return 2-3 mins; N/A - indurated):  1+ R upper arm and forearm     Test measurements:     UE AROM   · Able to fully fist and oppose thumb to each finger B.  · R wrist limited 25% globally with tightness vs L WFLs  · R elbow  deg with tightness, L elbow 0-150 deg  · R shoulder flexion 100 deg, L 115 deg with tightness with each  · R shoulder abd 75 deg, L 90 deg with tightness with each  · B shoulder ER reach C7 with tightness with each  · B shoulder IR reach T10 with tightness with each  UE Strength:   Shoulder deferred due to limited ROM  B elbow flexion 4-/5  B wrists 5/5  B  strength with moderate weakness in each    04/01/2020: AROM B shoulders 145 deg flexion, B shoulder abd 150 deg, R elbow 5-150 deg    GIRTH MEASUREMENT FLOW SHEET:     R upper extremity  (cm) 03/25/2020 R Upper Extremity  (cm) @ IE L Upper Extremity  (cm) @ IE    2nd finger Cuticle  5.3 5.3 5.2   MC heads 19.3 19.6 19.5   Ulnar styloid process  16.2 16.2 16.0   Mid-forearm (8 cm proximal to ulnar styloid) 19.7 19.7 18.2   Mid-forearm ( 20 cm proximal to ulnar styloid) 27.5 27.8 25.9   Elbow crease 25.7 26.5 25.5   Mid upper arm (10 cm proximal to elbow crease) 31.1 31.4 29.1   Axilla 31.0 31.1 30.0         Exercises, Neuro Facilitation & Gait Training (12121, [id number], [id number]): Activity Resistance/Repetitions Other comments                            Therapeutic Activities (20441):        Home Exercise Program:   Pt. demonstrated good understanding and knowledge of HEP. Written instructions provided.     03/13/2020: AROM elbows/wrists, putty squeeze, table slides  03/23/2020: encouraged caution with L table glide stretch flexion since is beginning radiation today   04/01/2020: hands behind head stretch    Manual Treatments (49258):    MLD (neck, abdomen, ant/post trunk via axillo-inguinal pathway B, B UE)  Avoid axillo-axilla pathway due to lymph nodes removed L with good tolerance  3-D manual for cording L axilla with good tolerance - cord less prominent  Caution L breast when start radiation 3/23/2020    Modalities:  NA    Timed Code Treatment Minutes:  MT: 55    Total Treatment Minutes:  55    Treatment/Activity Tolerance:  [x] Patient tolerated treatment well [] Patient limited by fatigue  [] Patient limited by pain  [] Patient limited by other medical complications  [] Other:     Assessment: good tolerance to treatment    Prognosis: [x] Good [] Fair  [] Poor    Patient Requires Follow-up: [x] Yes  [] No    Goals:    Short term goals (to be met in 1 weeks)   · Short term goal 1: Pt will verbalize at least 3 ways to reduce risk of lymphedema   · Short term goal 2: Pt will verbalize at least 3 signs/symptoms of infection     Long term goals (to be met in 5 weeks)   · Pt goal: \"complete range and motion\"  · Long term goal 1: Pt will verbalize good understanding of techniques (HEP and MLD) to perform if lymphedema is exacerbated in the

## 2020-04-13 ENCOUNTER — APPOINTMENT (OUTPATIENT)
Dept: PHYSICAL THERAPY | Age: 50
End: 2020-04-13
Payer: COMMERCIAL

## 2020-04-15 ENCOUNTER — HOSPITAL ENCOUNTER (OUTPATIENT)
Dept: PHYSICAL THERAPY | Age: 50
Setting detail: THERAPIES SERIES
Discharge: HOME | End: 2020-04-15
Payer: COMMERCIAL

## 2020-04-15 PROCEDURE — 97140 MANUAL THERAPY 1/> REGIONS: CPT

## 2020-04-15 NOTE — PROGRESS NOTES
allow for N lifting/carrying without increased pain for laundry and groceries. not assessed      Frequency/Duration: (decreased to 1x per week April 1 due to COVID-19)  # Days per week: [] 1 day # Weeks: [] 1 week [] 4 weeks      [x] 2 days? [] 2 weeks [x] 5 weeks      [] 3 days   [] 3 weeks [] 6 weeks     Rehab Potential: [] Excellent [x] Good [] Fair  [] Poor     Goal Status:  [] Achieved [x] Partially Achieved  [] Not Achieved     Patient Status: [x] Continue per initial plan of Care, pt has 4 visits remaining on initial POC. Pt making good progress towards goals but still with increased girth R UE vs L UE and tightness limiting N functional activity as previous. Further improvement expected with additional therapy. [] Patient now discharged     [] Additional visits requested, Please re-certify for additional visits:      Requested frequency/duration:  X/week for weeks    Electronically signed by: Skylar Shane, PT, DPT [id number]    If you have any questions or concerns, please don't hesitate to call.   Thank you for your referral.    Physician Signature:________________________________Date:__________________  By signing above, therapists plan is approved by physician

## 2020-04-15 NOTE — FLOWSHEET NOTE
WFLs but with tightness L axilla at endrange, R elbow 0 deg ext, B ER reach T3 with tightness R tricep, B IR reach to T7 each without tightness    GIRTH MEASUREMENT FLOW SHEET:     R Upper extremity  (cm)  04/15/2020 R upper extremity  (cm) 03/25/2020 R Upper Extremity  (cm) @ IE L Upper Extremity  (cm) @ IE    2nd finger Cuticle  4.9 5.3 5.3 5.2   MC heads 19.2 19.3 19.6 19.5   Ulnar styloid process  16.1 16.2 16.2 16.0   Mid-forearm (8 cm proximal to ulnar styloid) 19.3 19.7 19.7 18.2   Mid-forearm ( 20 cm proximal to ulnar styloid) 27.4 27.5 27.8 25.9   Elbow crease 25.8 25.7 26.5 25.5   Mid upper arm (10 cm proximal to elbow crease) 30.0 31.1 31.4 29.1   Axilla 31.3 31.0 31.1 30.0         Exercises, Neuro Facilitation & Gait Training (50031, [id number], [id number]): Activity Resistance/Repetitions Other comments                            Therapeutic Activities (96556):        Home Exercise Program:   Pt. demonstrated good understanding and knowledge of HEP. Written instructions provided.     03/13/2020: AROM elbows/wrists, putty squeeze, table slides  03/23/2020: encouraged caution with L table glide stretch flexion since is beginning radiation today   04/01/2020: hands behind head stretch    Manual Treatments (71149):    MLD (neck, abdomen, ant/post trunk via axillo-inguinal pathway B, B UE)  Avoid axillo-axilla pathway due to lymph nodes removed L with good tolerance  3-D manual for cording L axilla with good tolerance - cord less prominent  Caution L breast when start radiation 3/23/2020    Modalities:  NA    Timed Code Treatment Minutes:  MT: 55, TE: 3    Total Treatment Minutes:  58    Treatment/Activity Tolerance:  [x] Patient tolerated treatment well [] Patient limited by fatigue  [] Patient limited by pain  [] Patient limited by other medical complications  [] Other:     Assessment: see goal assessment below    Prognosis: [x] Good [] Fair  [] Poor    Patient Requires Follow-up: [x] Yes  [] No    Goals: assessed 04/15/2020    Short term goals (to be met in 1 weeks)   · Short term goal 1: Pt will verbalize at least 3 ways to reduce risk of lymphedema MET   · Short term goal 2: Pt will verbalize at least 3 signs/symptoms of infection MET      Long term goals (to be met in 5 weeks)   · Pt goal: \"complete range and motion\" nearly met  · Long term goal 1: Pt will verbalize good understanding of techniques (HEP and MLD) to perform if lymphedema is exacerbated in the future. Partially met  · Long term goal 2: Increased ROM B shoulders and R elbow/wrist WFLs and no cording L axilla to allow for N reaching with pain no greater than 1/10. nearly met   · Long term goal 3: Decreased impairment per UEFI <10% impaired. Not assessed   · Long term goal 4: Pt will decrease girth at each position on R UE so within 0.5 cm of L for N fit of clothing. Partially met  · Long term goal 5: Decrease pitting from 1+ to 0 R UE for decreased risk of infection. not assessed  · Long term goal 6: B elbow/shoulder strength 5/5 with good  strength to allow for N lifting/carrying without increased pain for laundry and groceries. not assessed      Plan:   [x] Continue per plan of care [] Alter current plan (see comments)  [] Plan of care initiated [] Hold pending MD visit [] Discharge    Plan for Next Session:  Review HEP, continue MLD, 3-D cording manual    Electronically signed by:   Mary Hammond, DPT [id number]

## 2020-04-16 ENCOUNTER — APPOINTMENT (OUTPATIENT)
Dept: PHYSICAL THERAPY | Age: 50
End: 2020-04-16
Payer: COMMERCIAL

## 2020-04-20 ENCOUNTER — APPOINTMENT (OUTPATIENT)
Dept: PHYSICAL THERAPY | Age: 50
End: 2020-04-20
Payer: COMMERCIAL

## 2020-04-22 ENCOUNTER — HOSPITAL ENCOUNTER (OUTPATIENT)
Dept: PHYSICAL THERAPY | Age: 50
Setting detail: THERAPIES SERIES
Discharge: HOME | End: 2020-04-22
Payer: COMMERCIAL

## 2020-04-22 ENCOUNTER — APPOINTMENT (OUTPATIENT)
Dept: PHYSICAL THERAPY | Age: 50
End: 2020-04-22
Payer: COMMERCIAL

## 2020-04-22 PROCEDURE — 97140 MANUAL THERAPY 1/> REGIONS: CPT

## 2020-04-22 PROCEDURE — 97530 THERAPEUTIC ACTIVITIES: CPT

## 2020-04-22 NOTE — FLOWSHEET NOTE
weakness in each    04/01/2020: AROM B shoulders 145 deg flexion, B shoulder abd 150 deg, R elbow 5-150 deg  04/15/2020: AROM B shoulders 150 deg flexion, B shoulder abd WFLs but with tightness L axilla at endrange, R elbow 0 deg ext, B ER reach T3 with tightness R tricep, B IR reach to T7 each without tightness  04/22/2020: no cording present today L axilla as previous    GIRTH MEASUREMENT FLOW SHEET:     R Upper extremity  (cm)  04/15/2020 R upper extremity  (cm) 03/25/2020 R Upper Extremity  (cm) @ IE L Upper Extremity  (cm) @ IE    2nd finger Cuticle  4.9 5.3 5.3 5.2   MC heads 19.2 19.3 19.6 19.5   Ulnar styloid process  16.1 16.2 16.2 16.0   Mid-forearm (8 cm proximal to ulnar styloid) 19.3 19.7 19.7 18.2   Mid-forearm ( 20 cm proximal to ulnar styloid) 27.4 27.5 27.8 25.9   Elbow crease 25.8 25.7 26.5 25.5   Mid upper arm (10 cm proximal to elbow crease) 30.0 31.1 31.4 29.1   Axilla 31.3 31.0 31.1 30.0         Exercises, Neuro Facilitation & Gait Training (62236, [id number], [id number]): Activity Resistance/Repetitions Other comments      t-band elbow flex/ext Yellow x 10 each                     Therapeutic Activities (48552): Instructed pt in activating lymphatic system - neck, abdomen with deep breathing, inguinal nodes and working from B axilla to inguinal - pt verbalized good understanding. Home Exercise Program:   Pt. demonstrated good understanding and knowledge of HEP. Written instructions provided.     03/13/2020: AROM elbows/wrists, putty squeeze, table slides  03/23/2020: encouraged caution with L table glide stretch flexion since is beginning radiation today   04/01/2020: hands behind head stretch  04/22/2020: t-band elbow flex/ext    Manual Treatments (66228):    MLD (neck, abdomen, ant/post trunk via axillo-inguinal pathway B, B UE)  Avoid axillo-axilla pathway due to lymph nodes removed L and radiation L and R lymphedema with good tolerance   held due to not present  Caution L breast when start radiation 3/23/2020    Modalities:  NA    Timed Code Treatment Minutes:  MT: 45, TE: 5, TA: 10    Total Treatment Minutes:  60    Treatment/Activity Tolerance:  [x] Patient tolerated treatment well [] Patient limited by fatigue  [] Patient limited by pain  [] Patient limited by other medical complications  [] Other:     Assessment: cord L axilla no longer present - good tolerance to t-band exercises    Prognosis: [x] Good [] Fair  [] Poor    Patient Requires Follow-up: [x] Yes  [] No    Goals: assessed 04/15/2020    Short term goals (to be met in 1 weeks)   · Short term goal 1: Pt will verbalize at least 3 ways to reduce risk of lymphedema MET   · Short term goal 2: Pt will verbalize at least 3 signs/symptoms of infection MET      Long term goals (to be met in 5 weeks)   · Pt goal: \"complete range and motion\" nearly met  · Long term goal 1: Pt will verbalize good understanding of techniques (HEP and MLD) to perform if lymphedema is exacerbated in the future. Partially met  · Long term goal 2: Increased ROM B shoulders and R elbow/wrist WFLs and no cording L axilla to allow for N reaching with pain no greater than 1/10. nearly met   · Long term goal 3: Decreased impairment per UEFI <10% impaired. Not assessed   · Long term goal 4: Pt will decrease girth at each position on R UE so within 0.5 cm of L for N fit of clothing. Partially met  · Long term goal 5: Decrease pitting from 1+ to 0 R UE for decreased risk of infection. not assessed  · Long term goal 6: B elbow/shoulder strength 5/5 with good  strength to allow for N lifting/carrying without increased pain for laundry and groceries. not assessed      Plan:   [x] Continue per plan of care [] Alter current plan (see comments)  [] Plan of care initiated [] Hold pending MD visit [] Discharge    Plan for Next Session:  Review HEP, continue MLD, monitor holding 3-D cording manual    Electronically signed by:   Araceli Núñez, DPT [id number]

## 2020-04-27 ENCOUNTER — APPOINTMENT (OUTPATIENT)
Dept: PHYSICAL THERAPY | Age: 50
End: 2020-04-27
Payer: COMMERCIAL

## 2020-04-29 ENCOUNTER — HOSPITAL ENCOUNTER (OUTPATIENT)
Dept: PHYSICAL THERAPY | Age: 50
Setting detail: THERAPIES SERIES
Discharge: HOME | End: 2020-04-29
Payer: COMMERCIAL

## 2020-04-29 PROCEDURE — 97140 MANUAL THERAPY 1/> REGIONS: CPT

## 2020-04-29 PROCEDURE — 97110 THERAPEUTIC EXERCISES: CPT

## 2020-04-29 NOTE — FLOWSHEET NOTE
Plan:   [x] Continue per plan of care [] Alter current plan (see comments)  [] Plan of care initiated [] Hold pending MD visit [] Discharge    Plan for Next Session:  Review HEP, continue MLD, monitor holding 3-D cording manual    Electronically signed by:   Maynor Pisano DPT [id number]

## 2020-05-06 ENCOUNTER — HOSPITAL ENCOUNTER (OUTPATIENT)
Dept: PHYSICAL THERAPY | Age: 50
Setting detail: THERAPIES SERIES
Discharge: HOME | End: 2020-05-06
Payer: COMMERCIAL

## 2020-05-06 PROCEDURE — 97140 MANUAL THERAPY 1/> REGIONS: CPT

## 2020-05-06 NOTE — FLOWSHEET NOTE
Physical Therapy Daily Treatment Note  Date:  2020    Patient Name:  Gi Pepe    :  1970  MRN: [id number]  Restrictions/Precautions:  Hx L breast CA, seizures      Medical/Treatment Diagnosis Information:  ·   R upper extremity lymphedema I97.2, Malignant neoplasm of lower-inner quadrant of left female breast C50.312  ·   Lymphedema R UE, cording L UE s/p L lumpectomy and B reconstruction  Insurance/Certification information:    [facility], 30 visits  Physician Information:    Dr. Fatuma Brand MD Follow-up Visit: radiation 3/23  Plan of care signed (Y/N):  Y  Visit# / total visits:  9/10  Pain level: 0/10     Progress Note Due (10 visits or 30 days, whichever is less):    Recertification Note Due (End of POC or 90 days, whichever is less):      Subjective:  2020: Onset date: swelling/pain mid  R UE, cording 2 days after 2020 surgery  Course of tx: Pt diagnosed with L breast CA Aug 2019. Had chemo starting in Sept, every 3 weeks, lumpectomy L breast and ~ 4 L axillary lymph nodes removed 2020 with B reconstruction. Pt started immunotherapy after surgery and has had 2/6 treatments thus far. To begin radiation L breast 2020. Pt started to notice swelling R UE mid  weekend after access needle was kept in port (Monday) for fluids later in the week, removed the day had fluids (Friday). Is gradually getting better. Doppler R UE:   No evidence of deep vein or superficial venous thrombosis of the right upper    extremity. Chest CT: no acute abnormality or gross evidence of metastatic disease is identified. Noted cording L axilla 2 days after surgery which has improved some. Pt hasn't had any treatment for either of these issues yet. No infections reported. Does note some improper fit of clothing.         Pain Level, Description, Location: neuropathy in fingertips/tingling/shock B hands which started with chemo treatments, Pain 3/10 R upper arm, elbow, and anterior forearm (pain is improving), no pain L UE  Aggravating factors: lifting, carrying, reaching, pain/swelling worse in the AM  Alleviating factors: as the day progresses swelling and pain improve    03/23/2020: Pt reports she feels better already since IE: feels more ROM in both arms. Only complaints of tightness this AM, no pain. No questions from handouts given at IE. HEP going well.      03/25/2020: Pt reports overall less heaviness in R arm and thinks she can feel more of the muscle than the swelling when touching arm. 04/01/2020:  Tightness has not really been there at all, able to raise hands over head more for reaching. Also not so much heaviness. 04/08/2020: Each time it gets better, not as much pulling, wrist motion is better. Good tolerance to hands behind head stretch given last visit. 04/15/2020: Pt reports is feeling about 90% improved from IE. Still a little tightness R mid forearm to elbow and mid upper arm.      04/22/2020: Completed radiation treatments this past Monday, just some \"darkness\" of skin near axilla. Feels that tightness and heaviness is nonexistent. 04/29/2020: Pt reports no tightness in axilla and hasn't noticed cording since last visit. No significant increase in swelling noted. Does feel still weak in shoulders. 05/06/2020: Pt reports no specific complaints of pain/tightness - may have noticed a cord L axilla but unsure.              Objective:03/13/2020   Observation:    Pitting (0-no pitting; 1+ tissue return to normal immediately; 2+ tissue return 15-30 seconds; 3+ tissue return 1-1.5 mins; 4+ tissue return 2-3 mins; N/A - indurated):  1+ R upper arm and forearm     Test measurements:     UE AROM   · Able to fully fist and oppose thumb to each finger B.  · R wrist limited 25% globally with tightness vs L WFLs  · R elbow  deg with tightness, L elbow 0-150 deg  · R shoulder flexion 100 deg, L 115 deg with tightness with each  · R radiation today   04/01/2020: hands behind head stretch  04/22/2020: t-band elbow flex/ext  04/29/2020: t-band shoulder flex/ER    Manual Treatments (14333):    MLD (neck, abdomen, ant/post trunk via axillo-inguinal pathway B, B UE)  Avoid axillo-axilla pathway due to lymph nodes removed L and radiation L and R lymphedema with good tolerance  3-D manual for cording L axilla with no cord noted post manual  Caution L breast when start radiation 3/23/2020    Modalities:  NA    Timed Code Treatment Minutes:  MT: 55, TE: 3    Total Treatment Minutes:  58    Treatment/Activity Tolerance:  [x] Patient tolerated treatment well [] Patient limited by fatigue  [] Patient limited by pain  [] Patient limited by other medical complications  [] Other:     Assessment: improved cording after manual    Prognosis: [x] Good [] Fair  [] Poor    Patient Requires Follow-up: [x] Yes  [] No    Goals: assessed 04/15/2020    Short term goals (to be met in 1 weeks)   · Short term goal 1: Pt will verbalize at least 3 ways to reduce risk of lymphedema MET   · Short term goal 2: Pt will verbalize at least 3 signs/symptoms of infection MET      Long term goals (to be met in 5 weeks)   · Pt goal: \"complete range and motion\" nearly met  · Long term goal 1: Pt will verbalize good understanding of techniques (HEP and MLD) to perform if lymphedema is exacerbated in the future. Partially met  · Long term goal 2: Increased ROM B shoulders and R elbow/wrist WFLs and no cording L axilla to allow for N reaching with pain no greater than 1/10. nearly met   · Long term goal 3: Decreased impairment per UEFI <10% impaired. Not assessed   · Long term goal 4: Pt will decrease girth at each position on R UE so within 0.5 cm of L for N fit of clothing. Partially met  · Long term goal 5: Decrease pitting from 1+ to 0 R UE for decreased risk of infection.  not assessed  · Long term goal 6: B elbow/shoulder strength 5/5 with good  strength to allow for N lifting/carrying without increased pain for laundry and groceries. not assessed      Plan:   [x] Continue per plan of care [] Alter current plan (see comments)  [] Plan of care initiated [] Hold pending MD visit [] Discharge    Plan for Next Session:  Review HEP, continue MLD    Electronically signed by:   Maynor Pisano DPT [id number]

## 2020-05-13 ENCOUNTER — HOSPITAL ENCOUNTER (OUTPATIENT)
Dept: PHYSICAL THERAPY | Age: 50
Setting detail: THERAPIES SERIES
Discharge: HOME | End: 2020-05-13
Payer: COMMERCIAL

## 2020-05-13 NOTE — CARE COORDINATION
Physical Therapy  Cancellation/No-show Note  Patient Name:  Bernie Martinez  :  1970   Date:  2020  MRN: [id number]  Cancelled visits to date: 1  2020  No-shows to date: 0    For today's appointment patient:  [x]  Cancelled  []  Rescheduled appointment  []  No-show     Reason given by patient:  []  Patient ill  []  Conflicting appointment  []  No transportation    []  Conflict with work  [x]  No reason given  []  Other:     Comments:      Electronically signed by:   Mike Savage, DPT [id number]

## 2020-05-20 ENCOUNTER — HOSPITAL ENCOUNTER (OUTPATIENT)
Dept: PHYSICAL THERAPY | Age: 50
Setting detail: THERAPIES SERIES
Discharge: HOME | End: 2020-05-20
Payer: COMMERCIAL

## 2020-05-20 PROCEDURE — 97140 MANUAL THERAPY 1/> REGIONS: CPT

## 2020-05-20 PROCEDURE — 97530 THERAPEUTIC ACTIVITIES: CPT

## 2020-07-16 ENCOUNTER — HOSPITAL ENCOUNTER (OUTPATIENT)
Dept: NON INVASIVE DIAGNOSTICS | Age: 50
Discharge: HOME | End: 2020-07-16
Payer: COMMERCIAL

## 2020-07-16 LAB
LV EF: 53 %
LVEF MODALITY: NORMAL

## 2020-07-16 PROCEDURE — 93306 TTE W/DOPPLER COMPLETE: CPT

## 2020-07-16 PROCEDURE — 93356 MYOCRD STRAIN IMG SPCKL TRCK: CPT

## 2020-10-22 ENCOUNTER — HOSPITAL ENCOUNTER (OUTPATIENT)
Dept: MAMMOGRAPHY | Age: 50
Discharge: HOME | End: 2020-10-22

## 2020-10-22 PROCEDURE — G0279 TOMOSYNTHESIS, MAMMO: HCPCS

## 2021-11-04 ENCOUNTER — HOSPITAL ENCOUNTER (OUTPATIENT)
Dept: MAMMOGRAPHY | Age: 51
Discharge: HOME | End: 2021-11-04
Payer: COMMERCIAL

## 2021-11-04 DIAGNOSIS — Z85.3 PERSONAL HISTORY OF BREAST CANCER: ICD-10-CM

## 2021-11-04 PROCEDURE — G0279 TOMOSYNTHESIS, MAMMO: HCPCS

## 2022-11-10 ENCOUNTER — HOSPITAL ENCOUNTER (OUTPATIENT)
Dept: MAMMOGRAPHY | Age: 52
Discharge: HOME | End: 2022-11-10
Payer: COMMERCIAL

## 2022-11-10 ENCOUNTER — HOSPITAL ENCOUNTER (OUTPATIENT)
Dept: ULTRASOUND IMAGING | Age: 52
Discharge: HOME | End: 2022-11-10
Payer: COMMERCIAL

## 2022-11-10 VITALS — WEIGHT: 165 LBS | HEIGHT: 70 IN | BODY MASS INDEX: 23.62 KG/M2

## 2022-11-10 DIAGNOSIS — R92.8 ABNORMAL MAMMOGRAM: ICD-10-CM

## 2022-11-10 DIAGNOSIS — Z85.3 PERSONAL HISTORY OF MALIGNANT NEOPLASM OF BREAST: ICD-10-CM

## 2022-11-10 PROCEDURE — G0279 TOMOSYNTHESIS, MAMMO: HCPCS

## 2022-11-10 PROCEDURE — 76642 ULTRASOUND BREAST LIMITED: CPT

## 2023-07-06 PROBLEM — Z45.2 EXHAUSTED VASCULAR ACCESS: Status: ACTIVE | Noted: 2023-07-06

## 2023-07-07 ENCOUNTER — HOSPITAL ENCOUNTER (OUTPATIENT)
Age: 53
Setting detail: OUTPATIENT SURGERY
Discharge: HOME | End: 2023-07-07
Attending: SURGERY | Admitting: SURGERY
Payer: COMMERCIAL

## 2023-07-07 VITALS
DIASTOLIC BLOOD PRESSURE: 84 MMHG | WEIGHT: 172.2 LBS | TEMPERATURE: 97.4 F | HEIGHT: 70 IN | SYSTOLIC BLOOD PRESSURE: 131 MMHG | OXYGEN SATURATION: 100 % | BODY MASS INDEX: 24.65 KG/M2 | HEART RATE: 59 BPM | RESPIRATION RATE: 16 BRPM

## 2023-07-07 PROCEDURE — 2709999900 HC NON-CHARGEABLE SUPPLY: Performed by: SURGERY

## 2023-07-07 PROCEDURE — 7100000011 HC PHASE II RECOVERY - ADDTL 15 MIN: Performed by: SURGERY

## 2023-07-07 PROCEDURE — 7100000010 HC PHASE II RECOVERY - FIRST 15 MIN: Performed by: SURGERY

## 2023-07-07 PROCEDURE — 3600000012 HC SURGERY LEVEL 2 ADDTL 15MIN: Performed by: SURGERY

## 2023-07-07 PROCEDURE — 2500000003 HC RX 250 WO HCPCS: Performed by: SURGERY

## 2023-07-07 PROCEDURE — 6360000002 HC RX W HCPCS: Performed by: SURGERY

## 2023-07-07 PROCEDURE — 3600000002 HC SURGERY LEVEL 2 BASE: Performed by: SURGERY

## 2023-07-07 ASSESSMENT — PAIN - FUNCTIONAL ASSESSMENT: PAIN_FUNCTIONAL_ASSESSMENT: 0-10

## 2023-07-07 ASSESSMENT — PAIN SCALES - GENERAL: PAINLEVEL_OUTOF10: 0

## 2023-07-07 NOTE — PROGRESS NOTES
Patient has a lifting restriction for 1 week and stated she works a job that requires lifting greater than 10 pounds. Patient told to get a work excuse absence from Dr. Johnathan Perry office at her appointment on Monday.   Electronically signed by Silas Mckeon RN on 7/7/2023 at 3:24 PM

## 2024-08-02 DIAGNOSIS — C50.912 RECURRENT BREAST CANCER, LEFT (HCC): Primary | ICD-10-CM

## 2024-08-12 ENCOUNTER — OFFICE VISIT (OUTPATIENT)
Dept: SURGERY | Age: 54
End: 2024-08-12
Payer: COMMERCIAL

## 2024-08-12 VITALS
OXYGEN SATURATION: 99 % | BODY MASS INDEX: 24.98 KG/M2 | SYSTOLIC BLOOD PRESSURE: 129 MMHG | DIASTOLIC BLOOD PRESSURE: 82 MMHG | TEMPERATURE: 98.6 F | HEART RATE: 81 BPM | WEIGHT: 178.4 LBS | HEIGHT: 71 IN | RESPIRATION RATE: 16 BRPM

## 2024-08-12 DIAGNOSIS — C50.312 MALIGNANT NEOPLASM OF LOWER-INNER QUADRANT OF LEFT BREAST IN FEMALE, ESTROGEN RECEPTOR NEGATIVE (HCC): Primary | ICD-10-CM

## 2024-08-12 DIAGNOSIS — Z17.1 MALIGNANT NEOPLASM OF LOWER-INNER QUADRANT OF LEFT BREAST IN FEMALE, ESTROGEN RECEPTOR NEGATIVE (HCC): Primary | ICD-10-CM

## 2024-08-12 PROCEDURE — 99205 OFFICE O/P NEW HI 60 MIN: CPT | Performed by: SURGERY

## 2024-08-12 NOTE — PROGRESS NOTES
MD at [facility] OR    KNEE ARTHROSCOPY Right     PORT SURGERY Right 7/7/2023    RIGHT PORT REMOVAL performed by aDy Chairez MD at [facility] OR       Allergies:   Allergies   Allergen Reactions    Oxycodone Palpitations and Shortness Of Breath    Hydrocodone Hives     FHx: Past history of breast CA: Yes (cousin)    Meds:   No current outpatient medications on file.     No current facility-administered medications for this visit.     SocHx: Smoking: No    ETOH: occasional    Drug use: No    ROS   Constitutional: Negative for chills and fever.   HENT: Negative for congestion, facial swelling, and voice change.    Eyes: Negative for photophobia and visual disturbance.   Respiratory: Negative for apnea, cough, chest tightness and shortness of breath.    Cardiovascular: Negative for chest pain and palpitations.   Gastrointestinal: Negative for dysphagia and early satiety.  Genitourinary: Negative for difficulty urinating, dysuria, flank pain, frequency and hematuria.   Musculoskeletal: Negative for new gait problem, joint swelling and myalgias.   Skin: Negative for color change, pallor and rash.   Endocrine: negative for tremors, temperature intolerance or polydipsia.  Allergic/Immunologic: Negative for new environmental or food allergies.  Neurological: Negative for dizziness, seizures, speech difficulty, numbness.   Hematological: Negative for adenopathy.   Psychiatric/Behavioral: Negative for agitation and confusion.      EXAM  /82 (Site: Left Upper Arm, Position: Sitting, Cuff Size: Medium Adult)   Pulse 81   Temp 98.6 °F (37 °C) (Temporal)   Resp 16   Ht 1.8 m (5' 10.87\")   Wt 80.9 kg (178 lb 6.4 oz)   LMP 02/04/2015   SpO2 99%   BMI 24.98 kg/m²     GEN: NAD, pleasant, healthy  CVS: RRR  PULM: No respiratory distress  HEENT: PERRLA/EOMI; hearing appears within normal limits  NECK: Supple with trachea in midline, no masses  EXT: No lymphedema noted  ABD: soft/NT/ND   NEURO: No focal deficits, no obvious CN

## 2024-08-13 ENCOUNTER — ANESTHESIA EVENT (OUTPATIENT)
Dept: OPERATING ROOM | Age: 54
End: 2024-08-13
Payer: COMMERCIAL

## 2024-08-15 ENCOUNTER — ANESTHESIA (OUTPATIENT)
Dept: OPERATING ROOM | Age: 54
End: 2024-08-15
Payer: COMMERCIAL

## 2024-08-15 ENCOUNTER — HOSPITAL ENCOUNTER (OUTPATIENT)
Age: 54
Setting detail: OUTPATIENT SURGERY
Discharge: HOME | End: 2024-08-15
Attending: SURGERY | Admitting: SURGERY
Payer: COMMERCIAL

## 2024-08-15 ENCOUNTER — APPOINTMENT (OUTPATIENT)
Dept: GENERAL RADIOLOGY | Age: 54
End: 2024-08-15
Attending: SURGERY
Payer: COMMERCIAL

## 2024-08-15 VITALS
SYSTOLIC BLOOD PRESSURE: 139 MMHG | TEMPERATURE: 96.9 F | BODY MASS INDEX: 24.5 KG/M2 | WEIGHT: 175 LBS | OXYGEN SATURATION: 99 % | HEIGHT: 71 IN | HEART RATE: 54 BPM | DIASTOLIC BLOOD PRESSURE: 54 MMHG | RESPIRATION RATE: 16 BRPM

## 2024-08-15 PROCEDURE — 6360000002 HC RX W HCPCS: Performed by: SURGERY

## 2024-08-15 PROCEDURE — 2500000003 HC RX 250 WO HCPCS: Performed by: SURGERY

## 2024-08-15 PROCEDURE — 77001 FLUOROGUIDE FOR VEIN DEVICE: CPT

## 2024-08-15 PROCEDURE — 2500000003 HC RX 250 WO HCPCS

## 2024-08-15 PROCEDURE — 71045 X-RAY EXAM CHEST 1 VIEW: CPT

## 2024-08-15 PROCEDURE — C1788 PORT, INDWELLING, IMP: HCPCS | Performed by: SURGERY

## 2024-08-15 PROCEDURE — 6360000002 HC RX W HCPCS

## 2024-08-15 PROCEDURE — 3600000013 HC SURGERY LEVEL 3 ADDTL 15MIN: Performed by: SURGERY

## 2024-08-15 PROCEDURE — 3600000003 HC SURGERY LEVEL 3 BASE: Performed by: SURGERY

## 2024-08-15 PROCEDURE — 7100000000 HC PACU RECOVERY - FIRST 15 MIN: Performed by: SURGERY

## 2024-08-15 PROCEDURE — 2709999900 HC NON-CHARGEABLE SUPPLY: Performed by: SURGERY

## 2024-08-15 PROCEDURE — 2580000003 HC RX 258: Performed by: STUDENT IN AN ORGANIZED HEALTH CARE EDUCATION/TRAINING PROGRAM

## 2024-08-15 PROCEDURE — 7100000011 HC PHASE II RECOVERY - ADDTL 15 MIN: Performed by: SURGERY

## 2024-08-15 PROCEDURE — 3700000001 HC ADD 15 MINUTES (ANESTHESIA): Performed by: SURGERY

## 2024-08-15 PROCEDURE — 2580000003 HC RX 258: Performed by: SURGERY

## 2024-08-15 PROCEDURE — 3700000000 HC ANESTHESIA ATTENDED CARE: Performed by: SURGERY

## 2024-08-15 PROCEDURE — 7100000010 HC PHASE II RECOVERY - FIRST 15 MIN: Performed by: SURGERY

## 2024-08-15 PROCEDURE — A4217 STERILE WATER/SALINE, 500 ML: HCPCS | Performed by: SURGERY

## 2024-08-15 PROCEDURE — 7100000001 HC PACU RECOVERY - ADDTL 15 MIN: Performed by: SURGERY

## 2024-08-15 DEVICE — KIT POWERPRT CLEARVUE ISP IMPL PRT 6FRENCH ATTCH POLYUR: Type: IMPLANTABLE DEVICE | Site: CHEST | Status: FUNCTIONAL

## 2024-08-15 RX ORDER — SODIUM CHLORIDE 9 MG/ML
INJECTION, SOLUTION INTRAVENOUS PRN
Status: DISCONTINUED | OUTPATIENT
Start: 2024-08-15 | End: 2024-08-15 | Stop reason: HOSPADM

## 2024-08-15 RX ORDER — GLYCOPYRROLATE 0.2 MG/ML
INJECTION INTRAMUSCULAR; INTRAVENOUS PRN
Status: DISCONTINUED | OUTPATIENT
Start: 2024-08-15 | End: 2024-08-15 | Stop reason: SDUPTHER

## 2024-08-15 RX ORDER — SODIUM CHLORIDE 0.9 % (FLUSH) 0.9 %
5-40 SYRINGE (ML) INJECTION EVERY 12 HOURS SCHEDULED
Status: DISCONTINUED | OUTPATIENT
Start: 2024-08-15 | End: 2024-08-15 | Stop reason: HOSPADM

## 2024-08-15 RX ORDER — FENTANYL CITRATE 0.05 MG/ML
25 INJECTION, SOLUTION INTRAMUSCULAR; INTRAVENOUS EVERY 5 MIN PRN
Status: DISCONTINUED | OUTPATIENT
Start: 2024-08-15 | End: 2024-08-15 | Stop reason: HOSPADM

## 2024-08-15 RX ORDER — ONDANSETRON 2 MG/ML
4 INJECTION INTRAMUSCULAR; INTRAVENOUS
Status: DISCONTINUED | OUTPATIENT
Start: 2024-08-15 | End: 2024-08-15 | Stop reason: HOSPADM

## 2024-08-15 RX ORDER — HEPARIN SODIUM (PORCINE) LOCK FLUSH IV SOLN 100 UNIT/ML 100 UNIT/ML
5 SOLUTION INTRAVENOUS PRN
Status: CANCELLED | OUTPATIENT
Start: 2024-08-15

## 2024-08-15 RX ORDER — LIDOCAINE HYDROCHLORIDE 10 MG/ML
INJECTION, SOLUTION INFILTRATION; PERINEURAL
Status: COMPLETED | OUTPATIENT
Start: 2024-08-15 | End: 2024-08-15

## 2024-08-15 RX ORDER — IPRATROPIUM BROMIDE AND ALBUTEROL SULFATE 2.5; .5 MG/3ML; MG/3ML
1 SOLUTION RESPIRATORY (INHALATION)
Status: DISCONTINUED | OUTPATIENT
Start: 2024-08-15 | End: 2024-08-15 | Stop reason: HOSPADM

## 2024-08-15 RX ORDER — MAGNESIUM HYDROXIDE 1200 MG/15ML
LIQUID ORAL CONTINUOUS PRN
Status: COMPLETED | OUTPATIENT
Start: 2024-08-15 | End: 2024-08-15

## 2024-08-15 RX ORDER — OXYCODONE HYDROCHLORIDE 10 MG/1
10 TABLET ORAL PRN
Status: DISCONTINUED | OUTPATIENT
Start: 2024-08-15 | End: 2024-08-15 | Stop reason: HOSPADM

## 2024-08-15 RX ORDER — OXYCODONE HYDROCHLORIDE 5 MG/1
5 TABLET ORAL PRN
Status: DISCONTINUED | OUTPATIENT
Start: 2024-08-15 | End: 2024-08-15 | Stop reason: HOSPADM

## 2024-08-15 RX ORDER — FENTANYL CITRATE 50 UG/ML
INJECTION, SOLUTION INTRAMUSCULAR; INTRAVENOUS PRN
Status: DISCONTINUED | OUTPATIENT
Start: 2024-08-15 | End: 2024-08-15 | Stop reason: SDUPTHER

## 2024-08-15 RX ORDER — LIDOCAINE AND PRILOCAINE 25; 25 MG/G; MG/G
CREAM TOPICAL
Qty: 30 G | Refills: 10 | Status: SHIPPED | OUTPATIENT
Start: 2024-08-15

## 2024-08-15 RX ORDER — HEPARIN 100 UNIT/ML
SYRINGE INTRAVENOUS
Status: COMPLETED | OUTPATIENT
Start: 2024-08-15 | End: 2024-08-15

## 2024-08-15 RX ORDER — SODIUM CHLORIDE 0.9 % (FLUSH) 0.9 %
5-40 SYRINGE (ML) INJECTION PRN
Status: DISCONTINUED | OUTPATIENT
Start: 2024-08-15 | End: 2024-08-15 | Stop reason: HOSPADM

## 2024-08-15 RX ORDER — PROPOFOL 10 MG/ML
INJECTION, EMULSION INTRAVENOUS PRN
Status: DISCONTINUED | OUTPATIENT
Start: 2024-08-15 | End: 2024-08-15 | Stop reason: SDUPTHER

## 2024-08-15 RX ORDER — FENTANYL CITRATE 0.05 MG/ML
50 INJECTION, SOLUTION INTRAMUSCULAR; INTRAVENOUS EVERY 5 MIN PRN
Status: DISCONTINUED | OUTPATIENT
Start: 2024-08-15 | End: 2024-08-15 | Stop reason: HOSPADM

## 2024-08-15 RX ORDER — ONDANSETRON 2 MG/ML
INJECTION INTRAMUSCULAR; INTRAVENOUS PRN
Status: DISCONTINUED | OUTPATIENT
Start: 2024-08-15 | End: 2024-08-15 | Stop reason: SDUPTHER

## 2024-08-15 RX ORDER — ONDANSETRON 4 MG/1
4 TABLET, FILM COATED ORAL EVERY 8 HOURS PRN
Qty: 2 TABLET | Refills: 1 | Status: SHIPPED | OUTPATIENT
Start: 2024-08-15

## 2024-08-15 RX ORDER — KETOROLAC TROMETHAMINE 30 MG/ML
30 INJECTION, SOLUTION INTRAMUSCULAR; INTRAVENOUS ONCE
Status: COMPLETED | OUTPATIENT
Start: 2024-08-15 | End: 2024-08-15

## 2024-08-15 RX ORDER — ACETAMINOPHEN 325 MG/1
650 TABLET ORAL
Status: DISCONTINUED | OUTPATIENT
Start: 2024-08-15 | End: 2024-08-15 | Stop reason: HOSPADM

## 2024-08-15 RX ORDER — LIDOCAINE HYDROCHLORIDE 20 MG/ML
INJECTION, SOLUTION EPIDURAL; INFILTRATION; INTRACAUDAL; PERINEURAL PRN
Status: DISCONTINUED | OUTPATIENT
Start: 2024-08-15 | End: 2024-08-15 | Stop reason: SDUPTHER

## 2024-08-15 RX ORDER — MEPERIDINE HYDROCHLORIDE 25 MG/ML
12.5 INJECTION INTRAMUSCULAR; INTRAVENOUS; SUBCUTANEOUS EVERY 5 MIN PRN
Status: DISCONTINUED | OUTPATIENT
Start: 2024-08-15 | End: 2024-08-15 | Stop reason: HOSPADM

## 2024-08-15 RX ORDER — KETOROLAC TROMETHAMINE 10 MG/1
10 TABLET, FILM COATED ORAL EVERY 6 HOURS PRN
Qty: 10 TABLET | Refills: 0 | Status: SHIPPED | OUTPATIENT
Start: 2024-08-15 | End: 2025-08-15

## 2024-08-15 RX ORDER — MIDAZOLAM HYDROCHLORIDE 1 MG/ML
INJECTION INTRAMUSCULAR; INTRAVENOUS PRN
Status: DISCONTINUED | OUTPATIENT
Start: 2024-08-15 | End: 2024-08-15 | Stop reason: SDUPTHER

## 2024-08-15 RX ORDER — PROCHLORPERAZINE EDISYLATE 5 MG/ML
5 INJECTION INTRAMUSCULAR; INTRAVENOUS
Status: DISCONTINUED | OUTPATIENT
Start: 2024-08-15 | End: 2024-08-15 | Stop reason: HOSPADM

## 2024-08-15 RX ORDER — NALOXONE HYDROCHLORIDE 0.4 MG/ML
INJECTION, SOLUTION INTRAMUSCULAR; INTRAVENOUS; SUBCUTANEOUS PRN
Status: DISCONTINUED | OUTPATIENT
Start: 2024-08-15 | End: 2024-08-15 | Stop reason: HOSPADM

## 2024-08-15 RX ADMIN — SODIUM CHLORIDE 2000 MG: 900 INJECTION INTRAVENOUS at 09:31

## 2024-08-15 RX ADMIN — FENTANYL CITRATE 25 MCG: 50 INJECTION INTRAMUSCULAR; INTRAVENOUS at 09:49

## 2024-08-15 RX ADMIN — PROPOFOL 150 MCG/KG/MIN: 10 INJECTION, EMULSION INTRAVENOUS at 09:39

## 2024-08-15 RX ADMIN — ONDANSETRON 4 MG: 2 INJECTION INTRAMUSCULAR; INTRAVENOUS at 09:31

## 2024-08-15 RX ADMIN — SODIUM CHLORIDE: 9 INJECTION, SOLUTION INTRAVENOUS at 08:03

## 2024-08-15 RX ADMIN — KETOROLAC TROMETHAMINE 30 MG: 30 INJECTION, SOLUTION INTRAMUSCULAR at 10:41

## 2024-08-15 RX ADMIN — MIDAZOLAM 2 MG: 1 INJECTION INTRAMUSCULAR; INTRAVENOUS at 09:31

## 2024-08-15 RX ADMIN — GLYCOPYRROLATE 0.2 MG: 0.2 INJECTION, SOLUTION INTRAMUSCULAR; INTRAVENOUS at 09:31

## 2024-08-15 RX ADMIN — FENTANYL CITRATE 25 MCG: 50 INJECTION INTRAMUSCULAR; INTRAVENOUS at 09:31

## 2024-08-15 RX ADMIN — LIDOCAINE HYDROCHLORIDE 80 MG: 20 INJECTION, SOLUTION EPIDURAL; INFILTRATION; INTRACAUDAL; PERINEURAL at 09:38

## 2024-08-15 RX ADMIN — PROPOFOL 40 MG: 10 INJECTION, EMULSION INTRAVENOUS at 09:38

## 2024-08-15 ASSESSMENT — PAIN DESCRIPTION - LOCATION: LOCATION: BREAST

## 2024-08-15 ASSESSMENT — LIFESTYLE VARIABLES: SMOKING_STATUS: 0

## 2024-08-15 ASSESSMENT — PAIN DESCRIPTION - DESCRIPTORS
DESCRIPTORS: SORE
DESCRIPTORS: ACHING

## 2024-08-15 ASSESSMENT — PAIN - FUNCTIONAL ASSESSMENT
PAIN_FUNCTIONAL_ASSESSMENT: NONE - DENIES PAIN
PAIN_FUNCTIONAL_ASSESSMENT: ACTIVITIES ARE NOT PREVENTED
PAIN_FUNCTIONAL_ASSESSMENT: 0-10
PAIN_FUNCTIONAL_ASSESSMENT: PREVENTS OR INTERFERES WITH MANY ACTIVE NOT PASSIVE ACTIVITIES
PAIN_FUNCTIONAL_ASSESSMENT: 0-10

## 2024-08-15 ASSESSMENT — PAIN SCALES - GENERAL: PAINLEVEL_OUTOF10: 0

## 2024-08-15 ASSESSMENT — ENCOUNTER SYMPTOMS: SHORTNESS OF BREATH: 0

## 2024-08-15 NOTE — ANESTHESIA PRE PROCEDURE
Department of Anesthesiology  Preprocedure Note       Name:  Melanie Paul   Age:  54 y.o.  :  1970                                          MRN:  [id number]         Date:  8/15/2024      Surgeon: Surgeon(s):  Ruth Amato MD    Procedure: PORT INSERTION (Chest)    Medications prior to admission:   Prior to Admission medications    Not on File       Current medications:    Current Facility-Administered Medications   Medication Dose Route Frequency Provider Last Rate Last Admin    sodium chloride flush 0.9 % injection 5-40 mL  5-40 mL IntraVENous 2 times per day Memo Paul MD        sodium chloride flush 0.9 % injection 5-40 mL  5-40 mL IntraVENous PRN Memo Paul MD        0.9 % sodium chloride infusion   IntraVENous PRN Memo Paul MD 75 mL/hr at 08/15/24 0803 New Bag at 08/15/24 0803    ipratropium 0.5 mg-albuterol 2.5 mg (DUONEB) nebulizer solution 1 Dose  1 Dose Inhalation Once PRN Memo Paul MD           Allergies:    Allergies   Allergen Reactions    Hydrocodone Hives       Problem List:    Patient Active Problem List   Diagnosis Code    Poor venous access I87.8    Malignant neoplasm of lower-inner quadrant of left breast in female, estrogen receptor negative (HCC) C50.312, Z17.1    Exhausted vascular access Z45.2       Past Medical History:        Diagnosis Date    Breast cancer (HCC) 2019    left    History of therapeutic radiation     Hx antineoplastic chemo     Seizure disorder (HCC) 2003    tonic clonic seizures off and on x 7 yrs.-none since        Past Surgical History:        Procedure Laterality Date    BREAST BIOPSY Left 2020    LEFT BREAST ULTRASOUND GUIDED SEED LOCALIZATION performed by Day Chairez MD at [facility] OR    BREAST LUMPECTOMY Left 2020    LEFT LUMPECTOMY WITH SENTINEL NODE BIOPSY, BIOZORB PLACEMENT performed by Day Chairez MD at [facility] OR    BREAST REDUCTION SURGERY Bilateral 2020    BILATERAL ONCOPLASTIC BREAST

## 2024-08-15 NOTE — ANESTHESIA POSTPROCEDURE EVALUATION
Department of Anesthesiology  Postprocedure Note    Patient: Melanie Paul  MRN: [id number]  YOB: 1970  Date of evaluation: 8/15/2024    Procedure Summary       Date: 08/15/24 Room / Location: [address]    Anesthesia Start: 0931 Anesthesia Stop: 1021    Procedure: PORT INSERTION (Chest) Diagnosis:       Malignant neoplasm of left female breast, unspecified estrogen receptor status, unspecified site of breast (HCC)      Poor venous access      (Malignant neoplasm of left female breast, unspecified estrogen receptor status, unspecified site of breast (HCC) [C50.912])      (Poor venous access [I87.8])    Surgeons: Ruth Amato MD Responsible Provider: Yari Wade MD    Anesthesia Type: general ASA Status: 3            Anesthesia Type: No value filed.    James Phase I: James Score: 10    James Phase II:      Anesthesia Post Evaluation    Patient location during evaluation: bedside  Patient participation: complete - patient participated  Level of consciousness: awake and alert  Pain score: 2  Airway patency: patent  Nausea & Vomiting: no vomiting  Cardiovascular status: blood pressure returned to baseline  Respiratory status: acceptable  Hydration status: euvolemic  Pain management: adequate    No notable events documented.

## 2024-08-15 NOTE — BRIEF OP NOTE
Brief Postoperative Note    Name           : Melanie Paul  YOB: 1970  MRN             : [id number]    Pre-operative Diagnosis: 1.  Poor IV access  2.  Recurrent left breast cancer    Post-operative Diagnosis: Same    Procedure: 1.  Right subclavian Port-A-Cath placement (6 Kiswahili PowerPort) with fluoroscopic guidance    Anesthesia: MAC and Local    Surgeons/Assistants: Lm    Estimated Blood Loss: less than 50     Complications: None    Specimens: Was Not Obtained    Findings: Same as postop    Electronically signed by Ruth Amato MD on 8/15/2024 at 10:04 AM

## 2024-08-15 NOTE — H&P
Date of Surgery Update:  Melanie Paul was seen, history and physical examination reviewed, and patient examined by me today. There have been no significant clinical changes since the completion of the previous history and physical. The surgical site was confirmed by the patient and me.     I have presented reasonable alternatives to the patient's proposed care, treatment, and services. The discussion I have done encompassed risks, benefits, and side effects related to the alternatives and the risks related to not receiving the proposed care, treatment, and services.     All questions answered. Patient wishes to proceed.     Electronically signed by: Ruth Amato MD,8/15/2024,9:20 AM

## 2024-08-15 NOTE — DISCHARGE INSTRUCTIONS
1.  May remove dressing on 8/17/2024, leave Steri-Strips in place  2.  Ice pack to port site  3.  No driving today  4.  May ambulate, climb stairs  5.  No heavy lifting x 2 days  6.  May shower  7.  Call if problems or questions [phone]  8.  Follow-up with Dr. Chairez for surgical planning [phone]

## 2024-08-15 NOTE — PROGRESS NOTES
[facility] Pre-Admission Testing Electronic Communication Worksheet for OR/ENDO Procedures        Patient: Melanie Paul    DOS: 8/15/24    Transportation Confirmed: [x] YES    []  NO    History and Physical:  [x] YES    []  NO  [] N/A  If yes, please list doctor or Urgent Care and date of H&P: DOS by MD    Additional Clearance(Cardiac, Pulmonary, etc):  [] YES    [x]  NO    Pre-Admission Testing Visit:  [] YES    [x]  NO If no, do labs/testing need to be done DOS?  [] YES    []  NO  UNKNOWN    Medication Reconciliation Complete:  [x] YES    []  NO        Additional Notes:                Interview Complete: [x] YES    []  NO          Ellie Steve RN  3:49 PM  
Patient from Pacu to phase 2. Ice pack on right side of chest. She rates her pain 2/10  
Patient received med from [facility] pharmacy.     Discharge and education instructions reviewed with patient. Teach-back successful.  Pt verbalized understanding. Pt denied questions at this time. No acute distress noted. Patient instructed to follow-up as noted -   Patient verbalized understanding. Discharged per EDMD with discharge instructions. Pt discharged to private vehicle. Patient stable upon departure. Thanked patient for choosing [facility] for care.      
Patient up in chair eating  crackers and drinking juice. Offered pain medication. Patient states, she is fine.   
Pt resting comfortably in bed, still denies pain or nausea at this time, states feels ready to move to phase 2. Vss. Dressing to right chest still clean dry and intact, no signs of distress noted at this time.  
Pt to pacu from OR.pt asleep at arrival but arousable to voice, denies pain or nausea at this time, falls back to sleep easily. On 2L via NC per CRNA. Placed on monitor, vss. Dressing to right chest clean dry and intact, xray called. Pt updated on plan of care, no signs of distress noted at this time.  
Steri strips at biopsy site. Frozen skin tags on neck.  
Xray at bedside. Dr Oneil at bedside. states xray looks good with no pneumothorax noted, okay to move patient to phase 2 when ready. Scripts sent to retail pharmacy at patient request. Dr oneil states would like for patient to have IV dose of 30mg Toradol before patient leaves PACU since pt is going home with Toradol. Pt updated on plan of care, verbalized understanding.  
admission screening and protocol:       * Admitted with diarrhea?                         [] YES    [x]  NO     *Prior history of C-Diff. In last 3 months? [] YES    [x]  NO     *Antibiotic use in the past 6-8 weeks?      [x]  NO    []  YES                 If yes, which ANTIBIOTIC AND REASON______     *Prior hospitalization or nursing home in the last month? []  YES    [x]  NO        SAFETY FIRST..call before you fall

## 2024-08-16 ENCOUNTER — HOSPITAL ENCOUNTER (OUTPATIENT)
Dept: NUCLEAR MEDICINE | Age: 54
Discharge: HOME | End: 2024-08-16
Attending: SURGERY
Payer: COMMERCIAL

## 2024-08-16 ENCOUNTER — HOSPITAL ENCOUNTER (OUTPATIENT)
Dept: CT IMAGING | Age: 54
Discharge: HOME | End: 2024-08-16
Attending: SURGERY
Payer: COMMERCIAL

## 2024-08-16 DIAGNOSIS — C50.912 RECURRENT BREAST CANCER, LEFT (HCC): ICD-10-CM

## 2024-08-16 PROCEDURE — 71260 CT THORAX DX C+: CPT

## 2024-08-16 PROCEDURE — 6360000004 HC RX CONTRAST MEDICATION: Performed by: SURGERY

## 2024-08-16 PROCEDURE — 78306 BONE IMAGING WHOLE BODY: CPT | Performed by: SURGERY

## 2024-08-16 PROCEDURE — 3430000000 HC RX DIAGNOSTIC RADIOPHARMACEUTICAL: Performed by: SURGERY

## 2024-08-16 PROCEDURE — A9503 TC99M MEDRONATE: HCPCS | Performed by: SURGERY

## 2024-08-16 RX ORDER — TC 99M MEDRONATE 20 MG/10ML
25 INJECTION, POWDER, LYOPHILIZED, FOR SOLUTION INTRAVENOUS
Status: COMPLETED | OUTPATIENT
Start: 2024-08-16 | End: 2024-08-16

## 2024-08-16 RX ADMIN — IOPAMIDOL 75 ML: 755 INJECTION, SOLUTION INTRAVENOUS at 10:22

## 2024-08-16 RX ADMIN — IOPAMIDOL 50 ML: 612 INJECTION, SOLUTION INTRAVENOUS at 10:22

## 2024-08-16 RX ADMIN — TC 99M MEDRONATE 25 MILLICURIE: 20 INJECTION, POWDER, LYOPHILIZED, FOR SOLUTION INTRAVENOUS at 09:57

## 2024-08-19 NOTE — OP NOTE
fluoroscopic guidance.  A pocket was created inferior and medial to the needle insertion site.  The wire was brought through the pocket.  Sheath and dilator passed over the wire and the wire and dilator removed.  The cath was placed through the sheath and the sheath was removed.  The tip of the catheter was positioned in the distal superior vena cava under fluoroscopic guidance.  The catheter was cut to the appropriate length and attached to the port device and locked into place.  The port was cannulated with a Ahuja needle and aspirated and flushed easily.  The port space in the pocket and anchored to the chest wall using 3-0 Prolene suture.  The subcutaneous tissue was closed interrupted 4-0 Vicryl suture and the skin was closed with 4-0 Vicryl subcuticular stitch.  The port was again accessed and aspirated and flushed easily.  Benzoin Steri-Strips sterile gauze and Tegaderm dressing were applied.  All needle and sponge counts were correct.  The patient was returned to recovery in good condition I was present for the entire procedure.  A postprocedure chest x-ray was obtained.    Electronically signed by Ruth Amato MD on 8/19/2024 at 11:07 AM

## 2024-10-02 ENCOUNTER — HOSPITAL ENCOUNTER (OUTPATIENT)
Age: 54
Discharge: HOME | End: 2024-10-04
Attending: INTERNAL MEDICINE
Payer: COMMERCIAL

## 2024-10-02 VITALS
BODY MASS INDEX: 25.48 KG/M2 | WEIGHT: 178 LBS | HEIGHT: 70 IN | DIASTOLIC BLOOD PRESSURE: 82 MMHG | SYSTOLIC BLOOD PRESSURE: 117 MMHG

## 2024-10-02 DIAGNOSIS — I42.7 DILATED CARDIOMYOPATHY SECONDARY TO DRUG (HCC): ICD-10-CM

## 2024-10-02 LAB
ECHO AO ROOT DIAM: 2.9 CM
ECHO AO ROOT INDEX: 1.46 CM/M2
ECHO AV AREA PEAK VELOCITY: 2.6 CM2
ECHO AV AREA VTI: 2.3 CM2
ECHO AV AREA/BSA PEAK VELOCITY: 1.3 CM2/M2
ECHO AV AREA/BSA VTI: 1.2 CM2/M2
ECHO AV CUSP MM: 2.1 CM
ECHO AV MEAN GRADIENT: 4 MMHG
ECHO AV MEAN VELOCITY: 0.9 M/S
ECHO AV PEAK GRADIENT: 5 MMHG
ECHO AV PEAK VELOCITY: 1.2 M/S
ECHO AV VELOCITY RATIO: 0.75
ECHO AV VTI: 26.1 CM
ECHO BSA: 2 M2
ECHO EST RA PRESSURE: 3 MMHG
ECHO LA AREA 2C: 12.6 CM2
ECHO LA AREA 4C: 16.4 CM2
ECHO LA DIAMETER INDEX: 1.26 CM/M2
ECHO LA DIAMETER: 2.5 CM
ECHO LA MAJOR AXIS: 4.9 CM
ECHO LA MINOR AXIS: 4.4 CM
ECHO LA TO AORTIC ROOT RATIO: 0.86
ECHO LA VOL BP: 35 ML (ref 22–52)
ECHO LA VOL MOD A2C: 30 ML (ref 22–52)
ECHO LA VOL MOD A4C: 38 ML (ref 22–52)
ECHO LA VOL/BSA BIPLANE: 18 ML/M2 (ref 16–34)
ECHO LA VOLUME INDEX MOD A2C: 15 ML/M2 (ref 16–34)
ECHO LA VOLUME INDEX MOD A4C: 19 ML/M2 (ref 16–34)
ECHO LV E' LATERAL VELOCITY: 13.2 CM/S
ECHO LV E' SEPTAL VELOCITY: 11 CM/S
ECHO LV EDV 3D: 114 ML
ECHO LV EDV A4C: 79 ML
ECHO LV EDV INDEX 3D: 57 ML/M2
ECHO LV EDV INDEX A4C: 40 ML/M2
ECHO LV EF PHYSICIAN: 60 %
ECHO LV EJECTION FRACTION 3D: 55 %
ECHO LV EJECTION FRACTION A4C: 57 %
ECHO LV ESV 3D: 51 ML
ECHO LV ESV A4C: 34 ML
ECHO LV ESV INDEX 3D: 26 ML/M2
ECHO LV ESV INDEX A4C: 17 ML/M2
ECHO LV FRACTIONAL SHORTENING: 28 % (ref 28–44)
ECHO LV GLOBAL LONGITUDINAL STRAIN (GLS): -27.2 %
ECHO LV INTERNAL DIMENSION DIASTOLE INDEX: 2.36 CM/M2
ECHO LV INTERNAL DIMENSION DIASTOLIC: 4.7 CM (ref 3.9–5.3)
ECHO LV INTERNAL DIMENSION SYSTOLIC INDEX: 1.71 CM/M2
ECHO LV INTERNAL DIMENSION SYSTOLIC: 3.4 CM
ECHO LV IVSD: 1 CM (ref 0.6–0.9)
ECHO LV MASS 2D: 164.5 G (ref 67–162)
ECHO LV MASS 3D INDEX: 71.4 G/M2
ECHO LV MASS 3D: 142 G
ECHO LV MASS INDEX 2D: 82.6 G/M2 (ref 43–95)
ECHO LV POSTERIOR WALL DIASTOLIC: 1 CM (ref 0.6–0.9)
ECHO LV RELATIVE WALL THICKNESS RATIO: 0.43
ECHO LVOT AREA: 3.5 CM2
ECHO LVOT AV VTI INDEX: 0.67
ECHO LVOT DIAM: 2.1 CM
ECHO LVOT MEAN GRADIENT: 2 MMHG
ECHO LVOT PEAK GRADIENT: 3 MMHG
ECHO LVOT PEAK VELOCITY: 0.9 M/S
ECHO LVOT STROKE VOLUME INDEX: 30.4 ML/M2
ECHO LVOT SV: 60.6 ML
ECHO LVOT VTI: 17.5 CM
ECHO MV A VELOCITY: 0.94 M/S
ECHO MV AREA VTI: 2.4 CM2
ECHO MV E DECELERATION TIME (DT): 166 MS
ECHO MV E VELOCITY: 0.77 M/S
ECHO MV E/A RATIO: 0.82
ECHO MV E/E' LATERAL: 5.83
ECHO MV E/E' RATIO (AVERAGED): 6.42
ECHO MV E/E' SEPTAL: 7
ECHO MV LVOT VTI INDEX: 1.45
ECHO MV MAX VELOCITY: 1.1 M/S
ECHO MV MEAN GRADIENT: 2 MMHG
ECHO MV MEAN VELOCITY: 0.6 M/S
ECHO MV PEAK GRADIENT: 4 MMHG
ECHO MV VTI: 25.3 CM
ECHO PULMONARY ARTERY END DIASTOLIC PRESSURE: 13 MMHG
ECHO PV ACCELERATION TIME (AT): 98 MS
ECHO PV MAX VELOCITY: 0.9 M/S
ECHO PV MAX VELOCITY: 1.8 M/S
ECHO PV MEAN GRADIENT: 1 MMHG
ECHO PV MEAN VELOCITY: 0.5 M/S
ECHO PV PEAK GRADIENT: 3 MMHG
ECHO PV VTI: 14.2 CM
ECHO RA AREA 4C: 11.8 CM2
ECHO RA END SYSTOLIC VOLUME APICAL 4 CHAMBER INDEX BSA: 13 ML/M2
ECHO RA VOLUME: 26 ML
ECHO RIGHT VENTRICULAR SYSTOLIC PRESSURE (RVSP): 20 MMHG
ECHO RV FREE WALL PEAK S': 17.9 CM/S
ECHO RV INTERNAL DIMENSION: 2 CM
ECHO RV TAPSE: 2.9 CM (ref 1.7–?)
ECHO TV E WAVE: 0.6 M/S
ECHO TV PEAK GRADIENT: 3 MMHG
ECHO TV REGURGITANT MAX VELOCITY: 2.07 M/S
ECHO TV REGURGITANT PEAK GRADIENT: 14 MMHG

## 2024-10-02 PROCEDURE — 93306 TTE W/DOPPLER COMPLETE: CPT

## 2025-02-10 ENCOUNTER — OFFICE VISIT (OUTPATIENT)
Dept: SURGERY | Age: 55
End: 2025-02-10
Payer: COMMERCIAL

## 2025-02-10 VITALS
HEIGHT: 70 IN | BODY MASS INDEX: 28.03 KG/M2 | HEART RATE: 94 BPM | RESPIRATION RATE: 18 BRPM | DIASTOLIC BLOOD PRESSURE: 83 MMHG | WEIGHT: 195.8 LBS | SYSTOLIC BLOOD PRESSURE: 122 MMHG

## 2025-02-10 DIAGNOSIS — Z17.1 MALIGNANT NEOPLASM OF LOWER-INNER QUADRANT OF LEFT BREAST IN FEMALE, ESTROGEN RECEPTOR NEGATIVE (HCC): Primary | ICD-10-CM

## 2025-02-10 DIAGNOSIS — C50.312 MALIGNANT NEOPLASM OF LOWER-INNER QUADRANT OF LEFT BREAST IN FEMALE, ESTROGEN RECEPTOR NEGATIVE (HCC): Primary | ICD-10-CM

## 2025-02-10 PROCEDURE — 99214 OFFICE O/P EST MOD 30 MIN: CPT | Performed by: SURGERY

## 2025-02-10 NOTE — PROGRESS NOTES
for agitation and confusion.      EXAM  /83   Pulse 94   Resp 18   Ht 1.778 m (5' 10\")   Wt 88.8 kg (195 lb 12.8 oz)   LMP 02/04/2015   BMI 28.09 kg/m²     GEN: NAD, pleasant, healthy  CVS: RRR  PULM: No respiratory distress  HEENT: PERRLA/EOMI; hearing appears within normal limits  NECK: Supple with trachea in midline, no masses  EXT: No lymphedema noted  ABD: soft/NT/ND   NEURO: No focal deficits, no obvious CN deficits  BACK: Bilateral latiss muscle intact  BREAST:   Physical Exam    Bra size: 34DDD  Desired bra size: Same size  Ptosis grade:   R: 2   L: 2  The left breast size is smaller than the right breast.  There were no palpable masses with no palpable lymphadenopathy in the ipsilateral left axilla.   Nipple retraction: No   Healed Escobar pattern scars    Left breast sternal notch to nipple: 26 cm  Right breast sternal notch to nipple: 22 cm    Left breast nipple to inframammary fold: 7.8 cm  Right breast nipple to inframammary fold: 8 cm    Left breast width 16 cm  Right breast width 16 cm    IMAGING: Reviewed    IMP: 54 y.o. female with breast cancer who presents for discussion regarding immediate reconstruction options  PLAN: Discussed options with Melanie.  Will plan for B DTI reconstruction with NSM. We discussed the limitation of implant based reconstruction in the setting of radiation as well as the potential for asymmetry. She understands and desires to proceed. Will return after she completes her neoadjuvant chemotherapy and then will work with Dr. Chairez and schedule. I spoke to her oncologist and she will hold her Keytruda until 6 weeks postop.    A discussion regarding surgical options including immediate vs delayed approaches, implant based vs autologous, as well as additional planned revisional surgeries was performed with the patient. Multiple autologous donor sites were discussed as well. Clinical photos were obtained.  We additionally discussed the FDA recommendations

## 2025-02-11 ENCOUNTER — TELEPHONE (OUTPATIENT)
Dept: SURGERY | Age: 55
End: 2025-02-11

## 2025-02-11 RX ORDER — SODIUM CHLORIDE 0.9 % (FLUSH) 0.9 %
5-40 SYRINGE (ML) INJECTION PRN
OUTPATIENT
Start: 2025-02-11

## 2025-02-11 RX ORDER — SODIUM CHLORIDE 0.9 % (FLUSH) 0.9 %
5-40 SYRINGE (ML) INJECTION EVERY 12 HOURS SCHEDULED
OUTPATIENT
Start: 2025-02-11

## 2025-02-11 RX ORDER — SODIUM CHLORIDE 9 MG/ML
INJECTION, SOLUTION INTRAVENOUS PRN
OUTPATIENT
Start: 2025-02-11

## 2025-02-11 RX ORDER — SODIUM CHLORIDE, SODIUM LACTATE, POTASSIUM CHLORIDE, CALCIUM CHLORIDE 600; 310; 30; 20 MG/100ML; MG/100ML; MG/100ML; MG/100ML
INJECTION, SOLUTION INTRAVENOUS CONTINUOUS
OUTPATIENT
Start: 2025-02-11

## 2025-02-11 NOTE — TELEPHONE ENCOUNTER
The patient was in the office to see Dr. Wakefield yesterday.    PLAN: Discussed options with Melanie.  Will plan for B DTI reconstruction with NSM. We discussed the limitation of implant based reconstruction in the setting of radiation as well as the potential for asymmetry. She understands and desires to proceed. Will return after she completes her neoadjuvant chemotherapy and then will work with Dr. Chairez and schedule. I spoke to her oncologist and she will hold her Keytruda until 6 weeks postop.     I received the surgery letter.     I spoke with Yari at [facility] ([phone]) to see if CPT Code 27582, 65655 or 98722 require pre certification. The codes do require pre certification and was advised that the pre certification is handled by [employer].     I will submit clinicals to [employer] today via the provider portal. The clinicals that I submit are saved on my computer.     Pending Authorization # [id number]    I spoke with the patient at the home number listed to provider an insurance update.     Time HELD 3-    I will leave this phone note open.

## 2025-02-13 NOTE — TELEPHONE ENCOUNTER
I received a fax from [employer] dated 2-. I will scan the letter into Epic under the media tab.    APPROVED  Authorization # [id number]  Date Range 3- to 6-  CPT Code 64849, 35301  Twyla White    I spoke with the patient at the home number listed. The patient is now scheduled for surgery with  on 3-.     The patient is aware of H&P.    The patient is scheduled for her post op appointment 3-.     I will submit the case request to Cindy today.     I will fax the Alloderm order to Cindy lopez. I will scan the order and the fax success into Epic under the media tab.     I will email the surgery information and instructions to the patient today at [email or web address]     I will close this phone note.

## 2025-02-15 SDOH — HEALTH STABILITY: PHYSICAL HEALTH: ON AVERAGE, HOW MANY MINUTES DO YOU ENGAGE IN EXERCISE AT THIS LEVEL?: 20 MIN

## 2025-02-15 SDOH — HEALTH STABILITY: PHYSICAL HEALTH: ON AVERAGE, HOW MANY DAYS PER WEEK DO YOU ENGAGE IN MODERATE TO STRENUOUS EXERCISE (LIKE A BRISK WALK)?: 4 DAYS

## 2025-02-18 ENCOUNTER — OFFICE VISIT (OUTPATIENT)
Dept: FAMILY MEDICINE CLINIC | Age: 55
End: 2025-02-18
Payer: COMMERCIAL

## 2025-02-18 VITALS
WEIGHT: 195.2 LBS | HEART RATE: 108 BPM | BODY MASS INDEX: 27.94 KG/M2 | HEIGHT: 70 IN | OXYGEN SATURATION: 96 % | SYSTOLIC BLOOD PRESSURE: 100 MMHG | DIASTOLIC BLOOD PRESSURE: 62 MMHG

## 2025-02-18 DIAGNOSIS — Z17.1 MALIGNANT NEOPLASM OF LOWER-INNER QUADRANT OF LEFT BREAST IN FEMALE, ESTROGEN RECEPTOR NEGATIVE (HCC): Primary | ICD-10-CM

## 2025-02-18 DIAGNOSIS — C50.312 MALIGNANT NEOPLASM OF LOWER-INNER QUADRANT OF LEFT BREAST IN FEMALE, ESTROGEN RECEPTOR NEGATIVE (HCC): Primary | ICD-10-CM

## 2025-02-18 DIAGNOSIS — Z11.59 NEED FOR HEPATITIS C SCREENING TEST: ICD-10-CM

## 2025-02-18 DIAGNOSIS — Z11.4 ENCOUNTER FOR SCREENING FOR HIV: ICD-10-CM

## 2025-02-18 DIAGNOSIS — Z01.818 PRE-OP EXAM: ICD-10-CM

## 2025-02-18 PROCEDURE — 93000 ELECTROCARDIOGRAM COMPLETE: CPT | Performed by: STUDENT IN AN ORGANIZED HEALTH CARE EDUCATION/TRAINING PROGRAM

## 2025-02-18 PROCEDURE — 99203 OFFICE O/P NEW LOW 30 MIN: CPT | Performed by: STUDENT IN AN ORGANIZED HEALTH CARE EDUCATION/TRAINING PROGRAM

## 2025-02-18 RX ORDER — TRAMADOL HYDROCHLORIDE 50 MG/1
50 TABLET ORAL EVERY 6 HOURS PRN
COMMUNITY

## 2025-02-18 SDOH — ECONOMIC STABILITY: FOOD INSECURITY: WITHIN THE PAST 12 MONTHS, YOU WORRIED THAT YOUR FOOD WOULD RUN OUT BEFORE YOU GOT MONEY TO BUY MORE.: NEVER TRUE

## 2025-02-18 SDOH — ECONOMIC STABILITY: FOOD INSECURITY: WITHIN THE PAST 12 MONTHS, THE FOOD YOU BOUGHT JUST DIDN'T LAST AND YOU DIDN'T HAVE MONEY TO GET MORE.: NEVER TRUE

## 2025-02-18 ASSESSMENT — PATIENT HEALTH QUESTIONNAIRE - PHQ9
2. FEELING DOWN, DEPRESSED OR HOPELESS: NOT AT ALL
SUM OF ALL RESPONSES TO PHQ QUESTIONS 1-9: 0
1. LITTLE INTEREST OR PLEASURE IN DOING THINGS: NOT AT ALL
SUM OF ALL RESPONSES TO PHQ QUESTIONS 1-9: 0
SUM OF ALL RESPONSES TO PHQ9 QUESTIONS 1 & 2: 0

## 2025-02-18 NOTE — PROGRESS NOTES
Subjective:     Melanie Paul who presentsto the office today for a preoperative consultation at the request of surgeon Dr. Chairez who plans on performing Bilateral mastectomies on 3/11/25. Planned anesthesia isGeneral.  The patient has the following known anesthesia issues:  Denies   Patient has a bleeding risk of : .On Toradol in past, no longer taking   Patient's medications, allergies, past medical, surgical,social and family histories were reviewed and updated as appropriate.      #Breast cancer  -Denies history of heart attack, stroke, diabetes on insulin, CKD, heart failure but does have breast cancer, due for labs states is no longer taking Toradol, blood pressure 100/62 today  -   Past Medical History:   Diagnosis Date    Breast cancer (HCC) 08/2019    left    History of therapeutic radiation     Hx antineoplastic chemo     Seizure disorder (HCC) 05/2003    tonic clonic seizures off and on x 7 yrs.-none since 2010     Past Surgical History:   Procedure Laterality Date    BREAST BIOPSY Left 2/4/2020    LEFT BREAST ULTRASOUND GUIDED SEED LOCALIZATION performed by Day Chairez MD at [facility] OR    BREAST LUMPECTOMY Left 2/4/2020    LEFT LUMPECTOMY WITH SENTINEL NODE BIOPSY, BIOZORB PLACEMENT performed by Day Chairez MD at [facility] OR    BREAST REDUCTION SURGERY Bilateral 2/4/2020    BILATERAL ONCOPLASTIC BREAST REDUCTION performed by Alla Marinelli MD at [facility] OR    BREAST SURGERY      bx of breast    DILATION AND CURETTAGE OF UTERUS      HYSTERECTOMY (CERVIX STATUS UNKNOWN)      INSERTION / REMOVAL / REPLACEMENT VENOUS ACCESS CATHETER Right 9/16/2019    RIGHT PORT PLACEMENT performed by Day Chairez MD at [facility] OR    KNEE ARTHROSCOPY Right     PORT SURGERY Right 7/7/2023    RIGHT PORT REMOVAL performed by Day Chairez MD at [facility] OR    PORT SURGERY N/A 8/15/2024    PORT INSERTION performed by Ruth Amato MD at [facility] OR     History reviewed. No pertinent family history.  Social History

## 2025-02-18 NOTE — H&P (VIEW-ONLY)
Subjective:     Melanie Paul who presentsto the office today for a preoperative consultation at the request of surgeon Dr. Chairez who plans on performing Bilateral mastectomies on 3/11/25. Planned anesthesia isGeneral.  The patient has the following known anesthesia issues:  Denies   Patient has a bleeding risk of : .On Toradol in past, no longer taking   Patient's medications, allergies, past medical, surgical,social and family histories were reviewed and updated as appropriate.      #Breast cancer  -Denies history of heart attack, stroke, diabetes on insulin, CKD, heart failure but does have breast cancer, due for labs states is no longer taking Toradol, blood pressure 100/62 today  -   Past Medical History:   Diagnosis Date    Breast cancer (HCC) 08/2019    left    History of therapeutic radiation     Hx antineoplastic chemo     Seizure disorder (HCC) 05/2003    tonic clonic seizures off and on x 7 yrs.-none since 2010     Past Surgical History:   Procedure Laterality Date    BREAST BIOPSY Left 2/4/2020    LEFT BREAST ULTRASOUND GUIDED SEED LOCALIZATION performed by Day Chairez MD at [facility] OR    BREAST LUMPECTOMY Left 2/4/2020    LEFT LUMPECTOMY WITH SENTINEL NODE BIOPSY, BIOZORB PLACEMENT performed by Day Chairez MD at [facility] OR    BREAST REDUCTION SURGERY Bilateral 2/4/2020    BILATERAL ONCOPLASTIC BREAST REDUCTION performed by Alla Marinelli MD at [facility] OR    BREAST SURGERY      bx of breast    DILATION AND CURETTAGE OF UTERUS      HYSTERECTOMY (CERVIX STATUS UNKNOWN)      INSERTION / REMOVAL / REPLACEMENT VENOUS ACCESS CATHETER Right 9/16/2019    RIGHT PORT PLACEMENT performed by Day Chairez MD at [facility] OR    KNEE ARTHROSCOPY Right     PORT SURGERY Right 7/7/2023    RIGHT PORT REMOVAL performed by Day Chairez MD at [facility] OR    PORT SURGERY N/A 8/15/2024    PORT INSERTION performed by Ruth Amato MD at [facility] OR     History reviewed. No pertinent family history.  Social History

## 2025-02-19 DIAGNOSIS — C50.312 MALIGNANT NEOPLASM OF LOWER-INNER QUADRANT OF LEFT BREAST IN FEMALE, ESTROGEN RECEPTOR NEGATIVE (HCC): ICD-10-CM

## 2025-02-19 DIAGNOSIS — E03.9 HYPOTHYROIDISM, UNSPECIFIED TYPE: Primary | ICD-10-CM

## 2025-02-19 DIAGNOSIS — Z17.1 MALIGNANT NEOPLASM OF LOWER-INNER QUADRANT OF LEFT BREAST IN FEMALE, ESTROGEN RECEPTOR NEGATIVE (HCC): ICD-10-CM

## 2025-02-19 DIAGNOSIS — Z11.59 NEED FOR HEPATITIS C SCREENING TEST: ICD-10-CM

## 2025-02-19 DIAGNOSIS — Z01.818 PRE-OP EXAM: ICD-10-CM

## 2025-02-19 DIAGNOSIS — Z11.4 ENCOUNTER FOR SCREENING FOR HIV: ICD-10-CM

## 2025-02-19 LAB
ALBUMIN SERPL-MCNC: 4.5 G/DL (ref 3.4–5)
ALBUMIN/GLOB SERPL: 2 {RATIO} (ref 1.1–2.2)
ALP SERPL-CCNC: 113 U/L (ref 40–129)
ALT SERPL-CCNC: 14 U/L (ref 10–40)
ANION GAP SERPL CALCULATED.3IONS-SCNC: 9 MMOL/L (ref 3–16)
AST SERPL-CCNC: 17 U/L (ref 15–37)
BASOPHILS # BLD: 0 K/UL (ref 0–0.2)
BASOPHILS NFR BLD: 0.9 %
BILIRUB SERPL-MCNC: <0.2 MG/DL (ref 0–1)
BUN SERPL-MCNC: 10 MG/DL (ref 7–20)
CALCIUM SERPL-MCNC: 9.7 MG/DL (ref 8.3–10.6)
CHLORIDE SERPL-SCNC: 103 MMOL/L (ref 99–110)
CHOLEST SERPL-MCNC: 231 MG/DL (ref 0–199)
CO2 SERPL-SCNC: 28 MMOL/L (ref 21–32)
CREAT SERPL-MCNC: 1 MG/DL (ref 0.6–1.1)
DEPRECATED RDW RBC AUTO: 16 % (ref 12.4–15.4)
EOSINOPHIL # BLD: 0 K/UL (ref 0–0.6)
EOSINOPHIL NFR BLD: 0.8 %
GFR SERPLBLD CREATININE-BSD FMLA CKD-EPI: 66 ML/MIN/{1.73_M2}
GLUCOSE SERPL-MCNC: 86 MG/DL (ref 70–99)
HCT VFR BLD AUTO: 33.7 % (ref 36–48)
HCV AB SERPL QL IA: NORMAL
HDLC SERPL-MCNC: 58 MG/DL (ref 40–60)
HGB BLD-MCNC: 11.5 G/DL (ref 12–16)
INR PPP: 0.92 (ref 0.85–1.15)
LDLC SERPL CALC-MCNC: 150 MG/DL
LYMPHOCYTES # BLD: 0.8 K/UL (ref 1–5.1)
LYMPHOCYTES NFR BLD: 16.9 %
MCH RBC QN AUTO: 34.2 PG (ref 26–34)
MCHC RBC AUTO-ENTMCNC: 34.3 G/DL (ref 31–36)
MCV RBC AUTO: 99.8 FL (ref 80–100)
MONOCYTES # BLD: 0.4 K/UL (ref 0–1.3)
MONOCYTES NFR BLD: 9 %
NEUTROPHILS # BLD: 3.6 K/UL (ref 1.7–7.7)
NEUTROPHILS NFR BLD: 72.4 %
PLATELET # BLD AUTO: 452 K/UL (ref 135–450)
PMV BLD AUTO: 7.9 FL (ref 5–10.5)
POTASSIUM SERPL-SCNC: 4.4 MMOL/L (ref 3.5–5.1)
PROT SERPL-MCNC: 6.8 G/DL (ref 6.4–8.2)
PROTHROMBIN TIME: 12.6 SEC (ref 11.9–14.9)
RBC # BLD AUTO: 3.38 M/UL (ref 4–5.2)
SODIUM SERPL-SCNC: 140 MMOL/L (ref 136–145)
T4 FREE SERPL-MCNC: 0.8 NG/DL (ref 0.9–1.8)
TRIGL SERPL-MCNC: 113 MG/DL (ref 0–150)
TSH SERPL DL<=0.005 MIU/L-ACNC: 2.21 UIU/ML (ref 0.27–4.2)
VLDLC SERPL CALC-MCNC: 23 MG/DL
WBC # BLD AUTO: 4.9 K/UL (ref 4–11)

## 2025-02-19 RX ORDER — LEVOTHYROXINE SODIUM 25 UG/1
25 TABLET ORAL DAILY
Qty: 90 TABLET | Refills: 0 | Status: SHIPPED | OUTPATIENT
Start: 2025-02-19

## 2025-02-20 LAB
EST. AVERAGE GLUCOSE BLD GHB EST-MCNC: 114 MG/DL
HBA1C MFR BLD: 5.6 %
HIV 1+2 AB+HIV1 P24 AG SERPL QL IA: NORMAL
HIV 2 AB SERPL QL IA: NORMAL
HIV1 AB SERPL QL IA: NORMAL
HIV1 P24 AG SERPL QL IA: NORMAL

## 2025-03-03 NOTE — PROGRESS NOTES
[facility]    CC: Breast CA     Referring physician: Day Chairez MD    HPI: This is a 54 y.o. female with a PMHx as delineated below who presents in consultation for breast reconstruction. She was found to have left breast cancer and underwent breast conservation therapy with adjuvant radiation in 2019. She was found to have a new primary left breast cancer and desires to undergo bilateral mastectomy with reconstruction . Plastic surgery was consulted for evaluation and treatment.  Since her last evaluation, she completed chemotherapy 1/31. She has started Keytruda. This was held for upcoming surgery and will resume 6 weeks after surgery.     Since her last evaluation the patient denies any new medical conditions or concerns. She presents today with her family for pre-op and post-op education.     The specifics of her breast cancer workup / treatment is as follows:     Diagnosis: invasive ductal  Mo/Yr Diagnosed: 7/24  Breast Involved:Left  Surgery: Lumpectomy with sentinel lymph node biopsy  Date of Surgery: 2019  Stage: 1A  Wilmer status: Negative  ER: Negative AK: Negative HER2: Negative    Oncologist: Maranda Lópze MD  Radiation: YES (COMPLETED in 2019)    Chemo/Meds: Will start neoadjuvant chemotherapy (next week)  Pregnancy/Miscarriages: 4 / 2    PMHx:   Past Medical History:   Diagnosis Date    Breast cancer (HCC) 08/2019    left    History of therapeutic radiation     Hx antineoplastic chemo     Seizure disorder (HCC) 05/2003    tonic clonic seizures off and on x 7 yrs.-none since 2010     PSHx:   Past Surgical History:   Procedure Laterality Date    BREAST BIOPSY Left 2/4/2020    LEFT BREAST ULTRASOUND GUIDED SEED LOCALIZATION performed by Day Chairez MD at [facility] OR    BREAST LUMPECTOMY Left 2/4/2020    LEFT LUMPECTOMY WITH SENTINEL NODE BIOPSY, BIOZORB PLACEMENT performed by Day Chairez MD at [facility] OR    BREAST REDUCTION SURGERY Bilateral 2/4/2020

## 2025-03-04 ENCOUNTER — OFFICE VISIT (OUTPATIENT)
Dept: SURGERY | Age: 55
End: 2025-03-04
Payer: COMMERCIAL

## 2025-03-04 DIAGNOSIS — C50.312 MALIGNANT NEOPLASM OF LOWER-INNER QUADRANT OF LEFT BREAST IN FEMALE, ESTROGEN RECEPTOR NEGATIVE (HCC): Primary | ICD-10-CM

## 2025-03-04 DIAGNOSIS — Z17.1 MALIGNANT NEOPLASM OF LOWER-INNER QUADRANT OF LEFT BREAST IN FEMALE, ESTROGEN RECEPTOR NEGATIVE (HCC): Primary | ICD-10-CM

## 2025-03-04 PROCEDURE — 99214 OFFICE O/P EST MOD 30 MIN: CPT

## 2025-03-05 NOTE — PROGRESS NOTES
eating/ drinking as you will have very specific instructions to follow.  If you did not receive these, call your surgeon's office immediately.     5. MEDICATIONS:  Take the following medications with a SMALL sip of water:   Use your usual dose of inhalers the morning of surgery. BRING your rescue inhaler with you to hospital.   Anesthesia does NOT want you to take insulin the morning of surgery. They will control your blood sugar while you are at the hospital. Please contact your ordering physician for instructions regarding your insulin the night before your procedure. If you have an insulin pump, please keep it set on basal rate.   Bariatric patient's call your surgeon if on diabetic medications as some may need to be stopped 1 week prior to surgery    6. Do not swallow additional water when brushing teeth. No gum, candy, mints, or ice chips. Refrain from smoking or at least decrease the amount on day of surgery.    7. Morning of surgery:   Take a shower with an antibacterial soap (i.e., Safeguard or Dial) OR your physician may have instructed you to use Hibiclens.  Dress in loose, comfortable clothing appropriate for redressing after your procedure.   Do not wear jewelry (including body piercings), make-up (especially NO eye make-up), fingernail polish (NO toenail polish if foot/leg surgery), lotion, powders, or metal hairclips.   Do not shave or wax for 72 hours prior to procedure near your operative site. Shaving with a razor can irritate your skin and make it easier to develop an infection. On the day of your procedure, any hair that needs to be removed near the surgical site will be 'clipped' by a healthcare worker using a special clipper designed to avoid skin irritation.    8. Dentures, glasses, or contacts will need to be removed before your procedure. Bring cases for your glasses, contacts, dentures, or hearing aids to protect them while you are in surgery.      9. If you use a CPAP, please bring it with  you on the day of your procedure.    10. If you use oxygen at home, please bring your oxygen tank with you to hospital..     11. We recommend that valuable personal belongings such as cash, cell phones, e-tablets, or jewelry, be left at home during your stay. The hospital will not be responsible for valuables that are not secured in the hospital safe. However, if your insurance requires a co-pay, you may want to bring a method of payment, i.e., Check or credit card, if you wish to pay your co-pay the day of surgery.      12. If you are to stay overnight, you may bring a bag with personal items. Please have any large items you may need brought in by your family after your arrival to your hospital room.    13. If you have a Living Will or Durable Power of , please bring a copy on the day of your procedure.     How we keep you safe and work to prevent surgical site infections:   1. Health care workers should always check your ID bracelet to verify your name and birth date. You will be asked many times to state your name, date of birth, and allergies.    2. Health care workers should always clean their hands with soap or alcohol gel before providing care to you. It is okay to ask anyone if they cleaned their hands before they touch you.    3. You will be actively involved in verifying the type of procedure you are having and ensuring the correct surgical site. This will be confirmed multiple times prior to your procedure. Do NOT zuly your surgery site UNLESS instructed to by your surgeon.     4. When you are in the operating room, your surgical site will be cleansed with a special soap, and in most cases, you will be given an antibiotic before the surgery begins.      What to expect AFTER your procedure?  1. Immediately following your procedure, your will be taken to the PACU for the first phase of your recovery.  Your nurse will help you recover from any potential side effects of anesthesia, such as extreme  drowsiness, changes in your vital signs or breathing patterns. Nausea, headache, muscle aches, or sore throat may also occur after anesthesia.  Your nurse will help you manage these potential side effects.    2. For comfort and safety, arrange to have someone at home with you for the first 24 hours after discharge.    3. You and your family will be given written instructions about your diet, activity, dressing care, medications, and return visits.     4. Once at home, should issues with nausea, pain, or bleeding occur, or should you notice any signs of infection, you should call your surgeon.    5. Always clean your hands before and after caring for your wound. Do not let your family touch your surgery site without cleaning their hands.     6. Narcotic pain medications can cause significant constipation.  You may want to add a stool softener to your postoperative medication schedule or speak to your surgeon on how best to manage this SIDE EFFECT.    SPECIAL INSTRUCTIONS     Thank you for allowing us to care for you.  We strive to exceed your expectations in the delivery of care and service provided to you and your family.     If you need to contact the Pre-Admission Testing staff for any reason, please call us at [phone]    Instructions reviewed with patient during preadmission testing phone interview.  Kassie Howell RN.3/5/2025 .2:41 PM      ADDITIONAL EDUCATIONAL INFORMATION REVIEWED PER PHONE WITH YOU AND/OR YOUR FAMILY:  No Hibiclens® Bathing Instructions   Yes Antibacterial Soap

## 2025-03-10 PROBLEM — Z92.21 HISTORY OF CHEMOTHERAPY: Status: ACTIVE | Noted: 2025-03-10

## 2025-03-11 ENCOUNTER — APPOINTMENT (OUTPATIENT)
Dept: NUCLEAR MEDICINE | Age: 55
End: 2025-03-11
Attending: SURGERY
Payer: COMMERCIAL

## 2025-03-11 ENCOUNTER — ANESTHESIA (OUTPATIENT)
Dept: OPERATING ROOM | Age: 55
End: 2025-03-11
Payer: COMMERCIAL

## 2025-03-11 ENCOUNTER — ANESTHESIA EVENT (OUTPATIENT)
Dept: OPERATING ROOM | Age: 55
End: 2025-03-11
Payer: COMMERCIAL

## 2025-03-11 ENCOUNTER — HOSPITAL ENCOUNTER (OUTPATIENT)
Age: 55
Setting detail: OUTPATIENT SURGERY
Discharge: HOME | End: 2025-03-11
Attending: SURGERY | Admitting: SURGERY
Payer: COMMERCIAL

## 2025-03-11 VITALS
OXYGEN SATURATION: 96 % | TEMPERATURE: 96.9 F | WEIGHT: 197.6 LBS | HEIGHT: 70 IN | DIASTOLIC BLOOD PRESSURE: 73 MMHG | HEART RATE: 66 BPM | RESPIRATION RATE: 14 BRPM | BODY MASS INDEX: 28.29 KG/M2 | SYSTOLIC BLOOD PRESSURE: 117 MMHG

## 2025-03-11 DIAGNOSIS — C50.312 MALIGNANT NEOPLASM OF LOWER-INNER QUADRANT OF LEFT FEMALE BREAST, UNSPECIFIED ESTROGEN RECEPTOR STATUS: ICD-10-CM

## 2025-03-11 PROCEDURE — 6360000002 HC RX W HCPCS: Performed by: ANESTHESIOLOGY

## 2025-03-11 PROCEDURE — 7100000011 HC PHASE II RECOVERY - ADDTL 15 MIN: Performed by: SURGERY

## 2025-03-11 PROCEDURE — 88331 PATH CONSLTJ SURG 1 BLK 1SPC: CPT

## 2025-03-11 PROCEDURE — 2500000003 HC RX 250 WO HCPCS: Performed by: SURGERY

## 2025-03-11 PROCEDURE — 88342 IMHCHEM/IMCYTCHM 1ST ANTB: CPT

## 2025-03-11 PROCEDURE — 3600000014 HC SURGERY LEVEL 4 ADDTL 15MIN: Performed by: SURGERY

## 2025-03-11 PROCEDURE — 7100000000 HC PACU RECOVERY - FIRST 15 MIN: Performed by: SURGERY

## 2025-03-11 PROCEDURE — A9520 TC99 TILMANOCEPT DIAG 0.5MCI: HCPCS | Performed by: SURGERY

## 2025-03-11 PROCEDURE — 6360000002 HC RX W HCPCS: Performed by: SURGERY

## 2025-03-11 PROCEDURE — 3700000001 HC ADD 15 MINUTES (ANESTHESIA): Performed by: SURGERY

## 2025-03-11 PROCEDURE — 7100000001 HC PACU RECOVERY - ADDTL 15 MIN: Performed by: SURGERY

## 2025-03-11 PROCEDURE — 7100000010 HC PHASE II RECOVERY - FIRST 15 MIN: Performed by: SURGERY

## 2025-03-11 PROCEDURE — 97605 NEG PRS WND THER DME<=50SQCM: CPT | Performed by: SURGERY

## 2025-03-11 PROCEDURE — C1789 PROSTHESIS, BREAST, IMP: HCPCS | Performed by: SURGERY

## 2025-03-11 PROCEDURE — 3700000000 HC ANESTHESIA ATTENDED CARE: Performed by: SURGERY

## 2025-03-11 PROCEDURE — 3430000000 HC RX DIAGNOSTIC RADIOPHARMACEUTICAL: Performed by: SURGERY

## 2025-03-11 PROCEDURE — 6360000002 HC RX W HCPCS: Performed by: NURSE ANESTHETIST, CERTIFIED REGISTERED

## 2025-03-11 PROCEDURE — L8000 MASTECTOMY BRA: HCPCS | Performed by: SURGERY

## 2025-03-11 PROCEDURE — 2580000003 HC RX 258: Performed by: SURGERY

## 2025-03-11 PROCEDURE — 15860 IV NJX TST VASC FLO FLAP/GRF: CPT | Performed by: SURGERY

## 2025-03-11 PROCEDURE — C1729 CATH, DRAINAGE: HCPCS | Performed by: SURGERY

## 2025-03-11 PROCEDURE — 2500000003 HC RX 250 WO HCPCS: Performed by: NURSE ANESTHETIST, CERTIFIED REGISTERED

## 2025-03-11 PROCEDURE — 88307 TISSUE EXAM BY PATHOLOGIST: CPT

## 2025-03-11 PROCEDURE — 2720000010 HC SURG SUPPLY STERILE: Performed by: SURGERY

## 2025-03-11 PROCEDURE — 6370000000 HC RX 637 (ALT 250 FOR IP): Performed by: ANESTHESIOLOGY

## 2025-03-11 PROCEDURE — 38792 RA TRACER ID OF SENTINL NODE: CPT

## 2025-03-11 PROCEDURE — 15777 ACELLULAR DERM MATRIX IMPLT: CPT | Performed by: SURGERY

## 2025-03-11 PROCEDURE — 2709999900 HC NON-CHARGEABLE SUPPLY: Performed by: SURGERY

## 2025-03-11 PROCEDURE — 19340 INSJ BREAST IMPLT SM D MAST: CPT | Performed by: SURGERY

## 2025-03-11 PROCEDURE — 3600000004 HC SURGERY LEVEL 4 BASE: Performed by: SURGERY

## 2025-03-11 DEVICE — SMOOTH MODERATE PLUS PROFILE, 390CC  SMOOTH ROUND SILICONE
Type: IMPLANTABLE DEVICE | Site: BREAST | Status: FUNCTIONAL
Brand: MENTOR MEMORYGEL BOOST BREAST IMPLANT

## 2025-03-11 DEVICE — GRAFT HUM TISS THK2-2.8MM 164 SQ CM THCK L PERF CNTOUR ACELLULAR DERM: Type: IMPLANTABLE DEVICE | Site: BREAST | Status: FUNCTIONAL

## 2025-03-11 RX ORDER — SODIUM CHLORIDE, SODIUM LACTATE, POTASSIUM CHLORIDE, CALCIUM CHLORIDE 600; 310; 30; 20 MG/100ML; MG/100ML; MG/100ML; MG/100ML
INJECTION, SOLUTION INTRAVENOUS CONTINUOUS
Status: DISCONTINUED | OUTPATIENT
Start: 2025-03-11 | End: 2025-03-11 | Stop reason: HOSPADM

## 2025-03-11 RX ORDER — SUCCINYLCHOLINE CHLORIDE 20 MG/ML
INJECTION INTRAMUSCULAR; INTRAVENOUS
Status: DISCONTINUED | OUTPATIENT
Start: 2025-03-11 | End: 2025-03-11 | Stop reason: SDUPTHER

## 2025-03-11 RX ORDER — LABETALOL HYDROCHLORIDE 5 MG/ML
10 INJECTION, SOLUTION INTRAVENOUS
Status: DISCONTINUED | OUTPATIENT
Start: 2025-03-11 | End: 2025-03-11 | Stop reason: HOSPADM

## 2025-03-11 RX ORDER — LIDOCAINE HYDROCHLORIDE 20 MG/ML
INJECTION, SOLUTION INTRAVENOUS
Status: DISCONTINUED | OUTPATIENT
Start: 2025-03-11 | End: 2025-03-11 | Stop reason: SDUPTHER

## 2025-03-11 RX ORDER — NALOXONE HYDROCHLORIDE 0.4 MG/ML
INJECTION, SOLUTION INTRAMUSCULAR; INTRAVENOUS; SUBCUTANEOUS PRN
Status: DISCONTINUED | OUTPATIENT
Start: 2025-03-11 | End: 2025-03-11 | Stop reason: HOSPADM

## 2025-03-11 RX ORDER — SODIUM CHLORIDE 9 MG/ML
INJECTION, SOLUTION INTRAVENOUS PRN
Status: DISCONTINUED | OUTPATIENT
Start: 2025-03-11 | End: 2025-03-11 | Stop reason: HOSPADM

## 2025-03-11 RX ORDER — MEPERIDINE HYDROCHLORIDE 25 MG/ML
12.5 INJECTION INTRAMUSCULAR; INTRAVENOUS; SUBCUTANEOUS EVERY 5 MIN PRN
Status: DISCONTINUED | OUTPATIENT
Start: 2025-03-11 | End: 2025-03-11 | Stop reason: HOSPADM

## 2025-03-11 RX ORDER — SODIUM CHLORIDE 0.9 % (FLUSH) 0.9 %
5-40 SYRINGE (ML) INJECTION EVERY 12 HOURS SCHEDULED
Status: DISCONTINUED | OUTPATIENT
Start: 2025-03-11 | End: 2025-03-11 | Stop reason: HOSPADM

## 2025-03-11 RX ORDER — ONDANSETRON 2 MG/ML
4 INJECTION INTRAMUSCULAR; INTRAVENOUS
Status: COMPLETED | OUTPATIENT
Start: 2025-03-11 | End: 2025-03-11

## 2025-03-11 RX ORDER — ROCURONIUM BROMIDE 10 MG/ML
INJECTION, SOLUTION INTRAVENOUS
Status: DISCONTINUED | OUTPATIENT
Start: 2025-03-11 | End: 2025-03-11 | Stop reason: SDUPTHER

## 2025-03-11 RX ORDER — INDOCYANINE GREEN AND WATER 25 MG
KIT INJECTION
Status: DISCONTINUED | OUTPATIENT
Start: 2025-03-11 | End: 2025-03-11 | Stop reason: SDUPTHER

## 2025-03-11 RX ORDER — APREPITANT 40 MG/1
40 CAPSULE ORAL ONCE
Status: COMPLETED | OUTPATIENT
Start: 2025-03-11 | End: 2025-03-11

## 2025-03-11 RX ORDER — MIDAZOLAM HYDROCHLORIDE 1 MG/ML
INJECTION, SOLUTION INTRAMUSCULAR; INTRAVENOUS
Status: DISCONTINUED | OUTPATIENT
Start: 2025-03-11 | End: 2025-03-11 | Stop reason: SDUPTHER

## 2025-03-11 RX ORDER — ONDANSETRON 2 MG/ML
INJECTION INTRAMUSCULAR; INTRAVENOUS
Status: DISCONTINUED | OUTPATIENT
Start: 2025-03-11 | End: 2025-03-11 | Stop reason: SDUPTHER

## 2025-03-11 RX ORDER — LORAZEPAM 2 MG/ML
0.5 INJECTION INTRAMUSCULAR
Status: DISCONTINUED | OUTPATIENT
Start: 2025-03-11 | End: 2025-03-11 | Stop reason: HOSPADM

## 2025-03-11 RX ORDER — PROPOFOL 10 MG/ML
INJECTION, EMULSION INTRAVENOUS
Status: DISCONTINUED | OUTPATIENT
Start: 2025-03-11 | End: 2025-03-11 | Stop reason: SDUPTHER

## 2025-03-11 RX ORDER — OXYCODONE HYDROCHLORIDE 5 MG/1
5 TABLET ORAL
Status: COMPLETED | OUTPATIENT
Start: 2025-03-11 | End: 2025-03-11

## 2025-03-11 RX ORDER — CEPHALEXIN 500 MG/1
500 CAPSULE ORAL 4 TIMES DAILY
Qty: 40 CAPSULE | Refills: 0 | Status: SHIPPED | OUTPATIENT
Start: 2025-03-11 | End: 2025-03-21

## 2025-03-11 RX ORDER — DIPHENHYDRAMINE HYDROCHLORIDE 50 MG/ML
12.5 INJECTION, SOLUTION INTRAMUSCULAR; INTRAVENOUS
Status: DISCONTINUED | OUTPATIENT
Start: 2025-03-11 | End: 2025-03-11 | Stop reason: HOSPADM

## 2025-03-11 RX ORDER — FENTANYL CITRATE 50 UG/ML
25 INJECTION, SOLUTION INTRAMUSCULAR; INTRAVENOUS EVERY 5 MIN PRN
Status: DISCONTINUED | OUTPATIENT
Start: 2025-03-11 | End: 2025-03-11 | Stop reason: HOSPADM

## 2025-03-11 RX ORDER — ISOSULFAN BLUE 50 MG/5ML
INJECTION, SOLUTION SUBCUTANEOUS PRN
Status: DISCONTINUED | OUTPATIENT
Start: 2025-03-11 | End: 2025-03-11 | Stop reason: HOSPADM

## 2025-03-11 RX ORDER — SODIUM CHLORIDE 0.9 % (FLUSH) 0.9 %
5-40 SYRINGE (ML) INJECTION PRN
Status: DISCONTINUED | OUTPATIENT
Start: 2025-03-11 | End: 2025-03-11 | Stop reason: HOSPADM

## 2025-03-11 RX ORDER — PROCHLORPERAZINE EDISYLATE 5 MG/ML
5 INJECTION INTRAMUSCULAR; INTRAVENOUS
Status: DISCONTINUED | OUTPATIENT
Start: 2025-03-11 | End: 2025-03-11 | Stop reason: HOSPADM

## 2025-03-11 RX ORDER — IPRATROPIUM BROMIDE AND ALBUTEROL SULFATE 2.5; .5 MG/3ML; MG/3ML
1 SOLUTION RESPIRATORY (INHALATION)
Status: DISCONTINUED | OUTPATIENT
Start: 2025-03-11 | End: 2025-03-11 | Stop reason: HOSPADM

## 2025-03-11 RX ORDER — OXYCODONE AND ACETAMINOPHEN 5; 325 MG/1; MG/1
1 TABLET ORAL EVERY 6 HOURS PRN
Qty: 28 TABLET | Refills: 0 | Status: SHIPPED | OUTPATIENT
Start: 2025-03-11 | End: 2025-03-18

## 2025-03-11 RX ORDER — HYDROMORPHONE HYDROCHLORIDE 1 MG/ML
0.5 INJECTION, SOLUTION INTRAMUSCULAR; INTRAVENOUS; SUBCUTANEOUS EVERY 5 MIN PRN
Status: DISCONTINUED | OUTPATIENT
Start: 2025-03-11 | End: 2025-03-11 | Stop reason: HOSPADM

## 2025-03-11 RX ORDER — SCOPOLAMINE 1 MG/3D
1 PATCH, EXTENDED RELEASE TRANSDERMAL ONCE
Status: DISCONTINUED | OUTPATIENT
Start: 2025-03-11 | End: 2025-03-11 | Stop reason: HOSPADM

## 2025-03-11 RX ORDER — LIDOCAINE HYDROCHLORIDE 10 MG/ML
1 INJECTION, SOLUTION EPIDURAL; INFILTRATION; INTRACAUDAL; PERINEURAL
Status: DISCONTINUED | OUTPATIENT
Start: 2025-03-11 | End: 2025-03-11 | Stop reason: HOSPADM

## 2025-03-11 RX ADMIN — MIDAZOLAM HYDROCHLORIDE 2 MG: 1 INJECTION, SOLUTION INTRAMUSCULAR; INTRAVENOUS at 07:21

## 2025-03-11 RX ADMIN — ROCURONIUM BROMIDE 45 MG: 10 INJECTION, SOLUTION INTRAVENOUS at 09:08

## 2025-03-11 RX ADMIN — ONDANSETRON 4 MG: 2 INJECTION, SOLUTION INTRAMUSCULAR; INTRAVENOUS at 11:53

## 2025-03-11 RX ADMIN — INDOCYANINE GREEN AND WATER 7.5 MG: KIT at 09:28

## 2025-03-11 RX ADMIN — TILMANOCEPT 845 MICRO CURIE: KIT at 07:42

## 2025-03-11 RX ADMIN — TRANEXAMIC ACID 1000 MG: 100 INJECTION, SOLUTION INTRAVENOUS at 07:54

## 2025-03-11 RX ADMIN — SODIUM CHLORIDE, SODIUM LACTATE, POTASSIUM CHLORIDE, AND CALCIUM CHLORIDE: .6; .31; .03; .02 INJECTION, SOLUTION INTRAVENOUS at 06:53

## 2025-03-11 RX ADMIN — OXYCODONE 5 MG: 5 TABLET ORAL at 12:18

## 2025-03-11 RX ADMIN — WATER 2000 MG: 1 INJECTION INTRAMUSCULAR; INTRAVENOUS; SUBCUTANEOUS at 07:40

## 2025-03-11 RX ADMIN — SUCCINYLCHOLINE CHLORIDE 140 MG: 20 INJECTION, SOLUTION INTRAMUSCULAR; INTRAVENOUS at 07:35

## 2025-03-11 RX ADMIN — PROPOFOL 50 MG: 10 INJECTION, EMULSION INTRAVENOUS at 07:56

## 2025-03-11 RX ADMIN — HYDROMORPHONE HYDROCHLORIDE 0.5 MG: 1 INJECTION, SOLUTION INTRAMUSCULAR; INTRAVENOUS; SUBCUTANEOUS at 12:03

## 2025-03-11 RX ADMIN — SUGAMMADEX 200 MG: 100 INJECTION, SOLUTION INTRAVENOUS at 10:39

## 2025-03-11 RX ADMIN — PROPOFOL 150 MG: 10 INJECTION, EMULSION INTRAVENOUS at 07:35

## 2025-03-11 RX ADMIN — LIDOCAINE HYDROCHLORIDE 50 MG: 20 INJECTION, SOLUTION INTRAVENOUS at 07:35

## 2025-03-11 RX ADMIN — HYDROMORPHONE HYDROCHLORIDE 0.5 MG: 1 INJECTION, SOLUTION INTRAMUSCULAR; INTRAVENOUS; SUBCUTANEOUS at 07:56

## 2025-03-11 RX ADMIN — HYDROMORPHONE HYDROCHLORIDE 0.5 MG: 1 INJECTION, SOLUTION INTRAMUSCULAR; INTRAVENOUS; SUBCUTANEOUS at 07:44

## 2025-03-11 RX ADMIN — SODIUM CHLORIDE, SODIUM LACTATE, POTASSIUM CHLORIDE, AND CALCIUM CHLORIDE: .6; .31; .03; .02 INJECTION, SOLUTION INTRAVENOUS at 09:43

## 2025-03-11 RX ADMIN — FENTANYL CITRATE 25 MCG: 0.05 INJECTION, SOLUTION INTRAMUSCULAR; INTRAVENOUS at 11:47

## 2025-03-11 RX ADMIN — APREPITANT 40 MG: 40 CAPSULE ORAL at 07:05

## 2025-03-11 RX ADMIN — ROCURONIUM BROMIDE 5 MG: 10 INJECTION, SOLUTION INTRAVENOUS at 07:35

## 2025-03-11 RX ADMIN — ONDANSETRON 4 MG: 2 INJECTION INTRAMUSCULAR; INTRAVENOUS at 10:33

## 2025-03-11 ASSESSMENT — PAIN SCALES - GENERAL
PAINLEVEL_OUTOF10: 5
PAINLEVEL_OUTOF10: 0
PAINLEVEL_OUTOF10: 7
PAINLEVEL_OUTOF10: 5
PAINLEVEL_OUTOF10: 6
PAINLEVEL_OUTOF10: 0

## 2025-03-11 ASSESSMENT — PAIN DESCRIPTION - DESCRIPTORS: DESCRIPTORS: ACHING

## 2025-03-11 ASSESSMENT — PAIN DESCRIPTION - LOCATION
LOCATION: BREAST
LOCATION: BREAST

## 2025-03-11 ASSESSMENT — PAIN DESCRIPTION - ORIENTATION
ORIENTATION: RIGHT;LEFT
ORIENTATION: RIGHT;LEFT

## 2025-03-11 NOTE — ANESTHESIA PRE PROCEDURE
Department of Anesthesiology  Preprocedure Note       Name:  Melanie Paul   Age:  54 y.o.  :  1970                                          MRN:  [id number]         Date:  3/11/2025      Surgeon: Surgeon(s):  Day Chairez MD Kundu, Neilendu, MD Kundu, Neilendu, MD    Procedure: Procedure(s):  BILATERAL NIPPLE SPARING MASTECTOMIES, LEFT BREAST SENTINEL NODE BIOPSY  BILATERAL BREAST RECONSTRUCTION WITH DIRECT TO IMPLANT RECONSTRUCTION WITH ALLODERM; POSSIBLE STAGE 1 TISSUE EXPANDER PLACEMENT WITH ALLODERM    Medications prior to admission:   Prior to Admission medications    Medication Sig Start Date End Date Taking? Authorizing Provider   lidocaine-prilocaine (EMLA) 2.5-2.5 % cream Apply to port site one hour prior to port site one hour prior to treatment, cover with saran wrap or bandaid  Patient taking differently: Apply 1 each topically as needed for Pain (Prior to port use) Apply to port site one hour prior to port site one hour prior to treatment, cover with saran wrap or bandaid 8/15/24  Yes Ruth Amato MD   levothyroxine (SYNTHROID) 25 MCG tablet Take 1 tablet by mouth daily  Patient not taking: Reported on 3/11/2025 2/19/25   Gilberto Covington MD   traMADol (ULTRAM) 50 MG tablet Take 1 tablet by mouth every 6 hours as needed for Pain.    Provider, MD Jose   ondansetron (ZOFRAN) 4 MG tablet Take 1 tablet by mouth every 8 hours as needed for Nausea or Vomiting 8/15/24   Ruth Amato MD       Current medications:    Current Facility-Administered Medications   Medication Dose Route Frequency Provider Last Rate Last Admin   • lidocaine PF 1 % injection 1 mL  1 mL IntraDERmal Once PRN Kristian Maurer, DO       • lactated ringers infusion   IntraVENous Continuous Kristian Maurer DO       • sodium chloride flush 0.9 % injection 5-40 mL  5-40 mL IntraVENous 2 times per day Kristian Maurer, DO       • sodium chloride flush 0.9 % injection 5-40 mL  5-40 mL

## 2025-03-11 NOTE — ANESTHESIA POSTPROCEDURE EVALUATION
Department of Anesthesiology  Postprocedure Note    Patient: Melanie Paul  MRN: [id number]  YOB: 1970  Date of evaluation: 3/11/2025    Procedure Summary       Date: 03/11/25 Room / Location: [place] [address] / [facility]    Anesthesia Start: 0721 Anesthesia Stop: 1059    Procedures:       BILATERAL NIPPLE SPARING MASTECTOMIES, LEFT BREAST SENTINEL NODE BIOPSY (Bilateral: Breast)      BILATERAL BREAST RECONSTRUCTION WITH DIRECT TO IMPLANT RECONSTRUCTION WITH ALLODERM (Bilateral: Breast) Diagnosis:       Malignant neoplasm of lower-inner quadrant of left female breast, unspecified estrogen receptor status (HCC)      (Malignant neoplasm of lower-inner quadrant of left female breast, unspecified estrogen receptor status (HCC) [C50.312])    Surgeons: Day Chairez MD; Kuldeep Wakefield MD Responsible Provider: Eddi Ignacio MD    Anesthesia Type: general ASA Status: 2            Anesthesia Type: No value filed.    James Phase I: James Score: 5    James Phase II:      Anesthesia Post Evaluation    Patient location during evaluation: PACU  Patient participation: complete - patient participated  Level of consciousness: awake  Airway patency: patent  Nausea & Vomiting: no nausea and no vomiting  Cardiovascular status: blood pressure returned to baseline and hemodynamically stable  Respiratory status: acceptable  Hydration status: euvolemic  Multimodal analgesia pain management approach  Pain management: adequate    No notable events documented.

## 2025-03-11 NOTE — PROGRESS NOTES
Ambulatory Surgery/Procedure Discharge Note    Vitals:    03/11/25 1235   BP: 117/73   Pulse: 66   Resp: 14   Temp: 96.9 °F (36.1 °C)   SpO2: 96%       In: 1680 [P.O.:220; I.V.:1400]  Out: 30     Restroom use offered before discharge.  Yes    Pain assessment:  present - adequately treated  Pain Level: 5 (pain pill given)    Pt and family states \"ready to go home\". Pt alert and oriented x4. IV removed. Denies N/V or pain. Pt tolerating po intake. Discharge instructions given to pt and family with pt permission. Pt and family verbalized understanding of all instructions. Left with all belongings and discharge instructions.     Patient discharged to home/self care. Patient discharged via wheel chair by transporter to waiting family.

## 2025-03-11 NOTE — INTERVAL H&P NOTE
Update History & Physical    The patient's History and Physical of February 18, 2025 was reviewed with the patient and I examined the patient. There was no change. The surgical site was confirmed by the patient and me.     Plan: The risks, benefits, expected outcome, and alternative to the recommended procedure have been discussed with the patient. Patient understands and wants to proceed with the procedure.     Electronically signed by Vanesa Lund MD on 3/11/2025 at 6:45 AM

## 2025-03-11 NOTE — H&P
The patient was identified and examined.  The surgical site was marked by Dr Wakefield.  No interval changes in health status since H&P was performed.  The patient is cleared to proceed as scheduled.

## 2025-03-11 NOTE — PROGRESS NOTES
Patient arrived from OR to PACU # 4 s/p BILATERAL NIPPLE SPARING MASTECTOMIES, LEFT BREAST SENTINEL NODE BIOPSY (Bilateral: Breast)       BILATERAL BREAST RECONSTRUCTION WITH DIRECT TO IMPLANT RECONSTRUCTION WITH ALLODERM (Bilateral: Breast) per  and . Attached to PACU monitoring device, report received from CRNA who stated no problems intraoperatively. Arrived sedated from anesthesia. VSS   Pt spoke with Oleksandr Rodriguez again and she got an understanding from him that this is an investigation to see if pt has a work related injury and is not labeled as Worker's Comp at this time.

## 2025-03-11 NOTE — BRIEF OP NOTE
Brief Postoperative Note      Patient: Melanie Paul  YOB: 1970  MRN: [id number]    Date of Procedure: 3/11/2025    Pre-Op Diagnosis Codes:      * Malignant neoplasm of lower-inner quadrant of left female breast, unspecified estrogen receptor status (HCC) [C50.312]    Post-Op Diagnosis: Same       Procedure(s):  BILATERAL NIPPLE SPARING MASTECTOMIES, LEFT BREAST SENTINEL NODE BIOPSY  BILATERAL BREAST RECONSTRUCTION WITH DIRECT TO IMPLANT RECONSTRUCTION WITH ALLODERM    Surgeon(s):  Kuldeep Wakefield MD Kundu, Neilendu, MD Hernandez, Lydia E, MD    Assistant:  Surgical Assistant: Ritu Ward  Resident: Vanesa Lund MD    Anesthesia: General    Estimated Blood Loss (mL): less than 100     Complications: None    Specimens:   ID Type Source Tests Collected by Time Destination   A : SENTINEL NODE #1 LEFT AXILLA, COUNT 660, MARKED BY BLACK SUTURE Tissue Tissue SURGICAL PATHOLOGY Day Chairez MD 3/11/2025 0815    B : LEFT BREAST Tissue Tissue SURGICAL PATHOLOGY Day Chairez MD 3/11/2025 0918    C : RIGHT BREAST Tissue Tissue SURGICAL PATHOLOGY Day Chairez MD 3/11/2025 1015        Implants:  Implant Name Type Inv. Item Serial No.  Lot No. LRB No. Used Action   GRAFT HUM TISS THK2-2.8MM 164 SQ CM THCK L PERF CNTOUR ACELLULAR DERM - [id number]  GRAFT HUM TISS THK2-2.8MM 164 SQ CM THCK L PERF CNTOUR ACELLULAR DERM  [employer] [id number] Right 1 Implanted   GRAFT HUM TISS THK2-2.8MM 164 SQ CM THCK L PERF CNTOUR ACELLULAR DERM - [id number]  GRAFT HUM TISS THK2-2.8MM 164 SQ CM THCK L PERF CNTOUR ACELLULAR DERM  [employer] [id number] Right 1 Implanted   GRAFT HUM TISS THK2-2.8MM 164 SQ CM THCK L PERF CNTOUR ACELLULAR DERM - [id number]  GRAFT HUM TISS THK2-2.8MM 164 SQ CM THCK L PERF CNTOUR ACELLULAR DERM  [employer] [id number] Left 1 Implanted   GRAFT HUM TISS THK2-2.8MM 164 SQ CM THCK L PERF CNTOUR ACELLULAR DERM - [id number]  GRAFT HUM TISS  THK2-2.8MM 164 SQ CM THCK L PERF CNTOUR ACELLULAR DERM  [employer]- [id number] Left 1 Implanted         Drains:   Closed/Suction Drain Inferior;Lateral;Left Breast Bulb (Active)   Site Description Clean, dry & intact 03/11/25 1024   Dressing Status New dressing applied 03/11/25 1024   Drain Status To bulb suction 03/11/25 1024       Closed/Suction Drain Inferior;Right Breast Bulb (Active)       Negative Pressure Wound Therapy Breast Left;Lower (Active)       Findings:  Infection Present At Time Of Surgery (PATOS) (choose all levels that have infection present):  No infection present  Other Findings: [place] node negative for malignancy      [place] Node Biopsy for Breast Cancer - Left  Operation performed with curative intent. Yes   Tracer(s) used to identify sentinel nodes in the upfront surgery (non-neoadjuvant) setting (select all that apply). N/A   Tracer(s) used to identify sentinel nodes in the neoadjuvant setting (select all that apply). Dye and Radioactive tracer   All nodes (colored or non-colored) present at the end of a dye-filled lymphatic channel were removed. Yes   All significantly radioactive nodes were removed. Yes   All palpably suspicious nodes were removed. Yes   Biopsy-proven positive nodes marked with clips prior to chemotherapy were identified and removed. N/A       Electronically signed by Day Chairez MD on 3/11/2025 at 10:35 AM   10

## 2025-03-11 NOTE — PROGRESS NOTES
PACU Transfer to [facility]    Vitals:    03/11/25 1230   BP: 118/80   Pulse: 76   Resp: 14   Temp: 97.3 °F (36.3 °C)   SpO2: 99%         Intake/Output Summary (Last 24 hours) at 3/11/2025 1233  Last data filed at 3/11/2025 1231  Gross per 24 hour   Intake 1680 ml   Output 30 ml   Net 1650 ml     Pain assessment:    Pain Level: 5 (pain pill given)    Patient transferred to care of JOSE RN.    3/11/2025 12:33 PM

## 2025-03-11 NOTE — OP NOTE
[facility] PLASTIC & RECONSTRUCTIVE SURGERY     OPERATIVE DICTATION    NAME: Melanie Paul   MRN: [id number]  DATE: 3/11/2025     AGE: 54 y.o.    SURGEON: Kuldeep Wakefield MD  ASSISTANT: Vanesa Lund (PGY2), iRtu Ward (SA)     BREAST SURGEON: Day Chairez MD    PREOPERATIVE DIAGNOSIS: Acquired absence bilateral breast, status post mastectomy   POSTOPERATIVE DIAGNOSIS: Same     OPERATION: 1) Immediate bilateral direct to implant breast reconstruction  2) Insertion of Alloderm Acellular Dermal Matrix (328 cm^2))   3) Intraoperative SPY fluorescent angiography    4) Application of Natasha incisional wound vacuum device    ANESTHESIA: General     ESTIMATED BLOOD LOSS: <50 mL (from plastics)    OPERATIVE INDICATIONS: This is a 54 y.o. female who has a history of left breast cancer status post lumpectomy with oncoplastic reduction and subsequent adjuvant radiation in 2019. She developed a new left breast cancer and desires to proceed with bilateral mastectomies with reconstruction. Options of reconstruction were discussed with the patient and she opted for direct implant based options. We specifically discussed the deleterious effects radiation has on wound healing and implant based reconstruction and the plan of surgery, risks, benefits, alternatives, indications, limitations, possible complications, and future surgeries were discussed with the patient and she agreed to proceed.     OPERATIVE PROCEDURE: The patient was marked in the standing position in the preoperative holding area.  She was then brought to the operating room and placed in the supine position on the operating table. After satisfactory induction with general endotracheal anesthesia, the patient was then prepped and draped in the usual sterile fashion. Breast surgery commenced by performing their mastectomy. Details of the procedure are listed in a separate operative dictation.     Plastic surgery then scrubbed and obtained hemostasis on the left

## 2025-03-11 NOTE — DISCHARGE INSTRUCTIONS
maturation, but oftentimes revisions & planned staged surgeries occur prior to this.  - A bra should be worn at all times day and night after surgery. The bra should not be tight, have under wires, or have strong elastic. You will receive a surgical bra from the hospital stay as well as an additional clean bra to take home.  Wear these bras for the first week until your follow-up appointment.  - you have bilateral maria vacs (purple foam). Keep machines plugged in while at rest. If you will be out of the house for >30 mins, please bring your  with you. Please call office for any alerts. Do not get vacs wet.      Follow-up  Call your doctor's office at the first sign of:  Excessive worsening pain associated with pressure of the breast.   Enlargement of the breast area (eggplant color or worsening bruising).   Bleeding at the incision.   Redness, drainage or odor from the incisions.   Fever or chills.   Shortness of breath.     Do not hesitate to call if you have any questions or concerns    [facility] AMBULATORY PROCEDURE DISCHARGE INSTRUCTIONS    There are potential side effects of anesthesia or sedation you may experience for the first 24 hours.  These side effects include:    Confusion or Memory loss, Dizziness, or Delayed Reaction Times   [x]A responsible person should be with you for the next 24 hours.  Do not operate any vehicles (automobiles, bicycles, motorcycles) or power tools or machinery for 24 hours.  Do not sign any legal documents or make any legal decisions for 24 hours. Do not drink alcohol for 24 hours or while taking narcotic pain medication.      Nausea    [x]Start with light diet and progress to your normal diet as you feel like eating. However, if you experience nausea or repeated episodes of vomiting which persist beyond 12-24 hours, notify your physician.  Once nausea has passed, remember to keep drinking fluids.    Difficulty Passing Urine  [x]Drink extra amounts of fluid today.   keep you safe and lessen the negative effects of that stress.    FOLLOW-UP RECOVERY CARE:  [x]  Mar    18 Post op 10:00 AM  Sarah Nash APRN - CNP  [facility]  [address] [place] [address]  [address]  [facility] [id number] [phone]     Schedule an appointment with Dr. Day Chairez MD as soon as possible for a visit in 6 weeks  [address]  [address]  [facility] [id number] [phone]         Watch for these possible complications, symptoms, or side effects of anesthesia.  Call physician if they or any other problems occur:  Signs of INFECTION   > Fever over 101°     > Redness, swelling, hardness or warmth at the operative site   >Foul smelling or cloudy drainage at the operative site   Unrelieved PAIN  Unrelieved NAUSEA  Blood soaked dressing.  (Some oozing may be normal)  Inability to urinate      Numb, pale, blue, cold or tingling extremity      Physician:  ,      The above instructions were reviewed with patient/significant other.  The following additional patient specific information was reviewed with the patient/significant other:  [x]Procedure/physician specific instructions  []Medication information sheet(S) including potential side effects  []Nohelia’s egress test  []Pain Ball management  []FAQ Catheter associated blood stream infections  []FAQ Surgical Site Infections  [x]Other-tona information    I have read and understand the instructions given to me: ____________________________________________   (Patient/S.O. Signature)            Date/time 3/11/2025 11:28 AM                 If you smoke STOP. We care about your health!

## 2025-03-13 ENCOUNTER — TELEPHONE (OUTPATIENT)
Dept: SURGERY | Age: 55
End: 2025-03-13

## 2025-03-13 NOTE — TELEPHONE ENCOUNTER
Patient is day 2 post   BILATERAL NIPPLE SPARING MASTECTOMIES, LEFT BREAST SENTINEL NODE BIOPSY (Bilateral: Breast)  BILATERAL BREAST RECONSTRUCTION WITH DIRECT TO IMPLANT RECONSTRUCTION WITH ALLODERM (Bilateral: Breast)   Feeling well, denies fever, chills, vomiting. Wound vacs in place, functional. Bilateral drains in place, output as expected. Pain is well controlled. Tolerating pain medication and antibiotics. Reinforced use of colace and andrew lax along with the importance of staying hydrated and increasing protein intake. Patient is wearing compression garment as directed. Reviewed post surgery restrictions. Reminded of post op visit on 3/18.  No further questions at this time.

## 2025-03-15 NOTE — OP NOTE
Operative Note      Patient: Melanie Paul  YOB: 1970  MRN: [id number]    Date of Procedure: 3/11/2025    Pre-Op Diagnosis Codes:      * Malignant neoplasm of lower-inner quadrant of left female breast, unspecified estrogen receptor status (HCC) [C50.312], S/P neoadjuvant chemotherapy    Post-Op Diagnosis: Same       Procedure(s):  BILATERAL NIPPLE SPARING MASTECTOMIES, LEFT BREAST SENTINEL NODE BIOPSY  BILATERAL BREAST RECONSTRUCTION WITH DIRECT TO IMPLANT RECONSTRUCTION WITH ALLODERM    Surgeon(s):  Day Chairez MD Kundu, Neilendu, MD Kundu, Neilendu, MD    Assistant:   Surgical Assistant: Ritu Ward  Resident: Vanesa Lund MD    Anesthesia: General    Estimated Blood Loss (mL): less than 100     Complications: None    Specimens:   ID Type Source Tests Collected by Time Destination   A : SENTINEL NODE #1 LEFT AXILLA, COUNT 660, MARKED BY BLACK SUTURE Tissue Tissue SURGICAL PATHOLOGY Day Chairez MD 3/11/2025 0815    B : LEFT BREAST Tissue Tissue SURGICAL PATHOLOGY Day Chairez MD 3/11/2025 0918    C : RIGHT BREAST Tissue Tissue SURGICAL PATHOLOGY Day Chairez MD 3/11/2025 1015        Implants:  Implant Name Type Inv. Item Serial No.  Lot No. LRB No. Used Action   GRAFT HUM TISS THK2-2.8MM 164 SQ CM THCK L PERF CNTOUR ACELLULAR DERM - [id number]  GRAFT HUM TISS THK2-2.8MM 164 SQ CM THCK L PERF CNTOUR ACELLULAR DERM  [employer] [id number] Right 1 Implanted   GRAFT HUM TISS THK2-2.8MM 164 SQ CM THCK L PERF CNTOUR ACELLULAR DERM - [id number]  GRAFT HUM TISS THK2-2.8MM 164 SQ CM THCK L PERF CNTOUR ACELLULAR DERM  [employer] [id number] Right 1 Implanted   GRAFT HUM TISS THK2-2.8MM 164 SQ CM THCK L PERF CNTOUR ACELLULAR DERM - [id number]  GRAFT HUM TISS THK2-2.8MM 164 SQ CM THCK L PERF CNTOUR ACELLULAR DERM  [employer] [id number] Left 1 Implanted   GRAFT HUM TISS THK2-2.8MM 164 SQ CM THCK L PERF CNTOUR ACELLULAR DERM -  inframammary incision.  The breast was elevated off of the pectoralis muscle.  We then proceeded with development of the mastectomy flaps using electrocautery.  The dissection was carried to the level of the sternum clavicle and latissimus muscle.  The specimen was oriented using a margin map weight and submitted in formalin for permanent section.  We proceeded with contralateral prophylactic mastectomy as described for the left breast.  Once again the specimen was oriented using a margin map weight and submitted in formalin for permanent section.  At that point Dr. Wakefield performed spy angiography which demonstrated adequate perfusion for direct implant reconstruction.  Care of the patient was then transferred to Dr. Wakefield for reconstruction.  The second part of the operation is dictated under separate cover by Dr. Wakefield.    Electronically signed by Day Chairez MD on 3/15/2025 at 6:14 PM

## 2025-03-18 ENCOUNTER — OFFICE VISIT (OUTPATIENT)
Dept: SURGERY | Age: 55
End: 2025-03-18

## 2025-03-18 VITALS
BODY MASS INDEX: 28.06 KG/M2 | WEIGHT: 196 LBS | DIASTOLIC BLOOD PRESSURE: 80 MMHG | SYSTOLIC BLOOD PRESSURE: 114 MMHG | HEART RATE: 94 BPM | HEIGHT: 70 IN

## 2025-03-18 DIAGNOSIS — G89.18 POST-OP PAIN: Primary | ICD-10-CM

## 2025-03-18 PROCEDURE — 99024 POSTOP FOLLOW-UP VISIT: CPT

## 2025-03-18 RX ORDER — DIAZEPAM 5 MG/1
5 TABLET ORAL EVERY 12 HOURS PRN
Qty: 20 TABLET | Refills: 0 | Status: SHIPPED | OUTPATIENT
Start: 2025-03-18 | End: 2025-03-28

## 2025-03-18 NOTE — PROGRESS NOTES
[facility]    PROCEDURE:  1) Immediate bilateral direct to implant breast reconstruction  2) Insertion of Alloderm Acellular Dermal Matrix (328 cm^2))   3) Intraoperative SPY fluorescent angiography  4) Application of Natasha incisional wound vacuum device  DATE: 3/11/25    Melanie Paul has been recovering well since her procedure. Pain has been poorly controlled with the prescribed pain management regimen. She is still having some trouble sleeping at night, we discussed prescribing valium to help her at bedtime with discomfort.     EXAM    /80   Pulse 94   Ht 1.778 m (5' 10\")   Wt 88.9 kg (196 lb)   LMP 02/04/2015   BMI 28.12 kg/m²       GEN: NAD   BREAST: Wound vac in tact and holding suction. Drains serosang.     IMP: 54 y.o.female s/p B DTI with wound vac   PLAN: Overall doing well. She will follow up next week for potential drain removal and wound vac take down. We will send script for valium to the pharmacy, she will call with any concerns in the interim.     Sarah Nash, APRN - CNP   [facility]astic & Reconstructive Surgery  [phone]  03/18/25

## 2025-03-25 ENCOUNTER — OFFICE VISIT (OUTPATIENT)
Dept: SURGERY | Age: 55
End: 2025-03-25

## 2025-03-25 VITALS
SYSTOLIC BLOOD PRESSURE: 116 MMHG | HEIGHT: 70 IN | HEART RATE: 76 BPM | BODY MASS INDEX: 28.06 KG/M2 | DIASTOLIC BLOOD PRESSURE: 79 MMHG | WEIGHT: 196 LBS

## 2025-03-25 DIAGNOSIS — Z09 POSTOP CHECK: Primary | ICD-10-CM

## 2025-03-25 PROCEDURE — 99024 POSTOP FOLLOW-UP VISIT: CPT

## 2025-03-25 NOTE — PROGRESS NOTES
[facility]    PROCEDURE:  1) Immediate bilateral direct to implant breast reconstruction  2) Insertion of Alloderm Acellular Dermal Matrix (328 cm^2))   3) Intraoperative SPY fluorescent angiography  4) Application of Natasha incisional wound vacuum device  DATE: 3/11/25    Melanie Paul has been recovering well since her procedure. Pain has been well controlled with prescribed pain medication. She presents today for wound vac take down and bilateral breast drain removal.     EXAM    Ht 1.778 m (5' 10\")   LMP 02/04/2015   BMI 28.12 kg/m²       GEN: NAD   BREAST: Incision site healing appropriately. No hematoma/seroma.  Drains serosang.     IMP: 54 y.o.female s/p B DTI with wound vac   PLAN: Doing well overall. Bilateral drains removed and wound vac removal. Patient is happy thus far. Will follow up in 1 month with Dr. Wakefield to ensure continued healing. Will call with any concerns in the interim.      Sarah Nash, APRN - CNP   [facility]astic & Reconstructive Surgery  [phone]  03/25/25

## 2025-03-28 ENCOUNTER — TELEPHONE (OUTPATIENT)
Dept: FAMILY MEDICINE CLINIC | Age: 55
End: 2025-03-28

## 2025-03-28 NOTE — TELEPHONE ENCOUNTER
Spoke with patient who stated yesterday evening she had an episode where she was experiencing diarrhea, low blood pressure and felt as if she may lose consciousness. I instructed patient that if she was feeling like she may lose consciousness and her blood pressure drops low she needs to go straight to emergency room. Patient verbalized understanding.    She states she feels better this morning and she does have a family member with her. She did speak to her NP from surgery who instructed her if these symptoms persist to follow up with her primary care doctor.    She is scheduled to see you on 3/31/25. She wants to know if this is ok? Or does she need to be seen sooner?

## 2025-03-28 NOTE — TELEPHONE ENCOUNTER
[phone] ([place])     Patient called stating she'd like to speak to clinical staff at the above number when available.     She had a double mastectomy with reconstruction 3/11/2025 and in the past 2-3 weeks she's had digestive problems.     Stated she had a concerning incident regarding the issue last night but would not elaborate what.     She wants to know if Dr. Covington needs to see her sooner and made an appointment for 3/31/2025 just in case.

## 2025-04-14 ENCOUNTER — TELEPHONE (OUTPATIENT)
Dept: SURGERY | Age: 55
End: 2025-04-14

## 2025-04-14 NOTE — TELEPHONE ENCOUNTER
Patient called in with questions about her short term disability paperwork and an extension on her return to work date. Patient states that she would need a extension due to her line of work and having to walk long periods of time    Patient also would like to know when she can resume driving     Please contact the patient at [phone]

## 2025-04-16 ENCOUNTER — OFFICE VISIT (OUTPATIENT)
Dept: FAMILY MEDICINE CLINIC | Age: 55
End: 2025-04-16
Payer: COMMERCIAL

## 2025-04-16 VITALS
TEMPERATURE: 97.9 F | BODY MASS INDEX: 28.18 KG/M2 | HEIGHT: 70 IN | WEIGHT: 196.8 LBS | SYSTOLIC BLOOD PRESSURE: 118 MMHG | DIASTOLIC BLOOD PRESSURE: 80 MMHG | OXYGEN SATURATION: 98 % | HEART RATE: 73 BPM

## 2025-04-16 DIAGNOSIS — Z00.00 HEALTHCARE MAINTENANCE: Primary | ICD-10-CM

## 2025-04-16 DIAGNOSIS — E03.9 HYPOTHYROIDISM, UNSPECIFIED TYPE: ICD-10-CM

## 2025-04-16 PROCEDURE — 99396 PREV VISIT EST AGE 40-64: CPT | Performed by: STUDENT IN AN ORGANIZED HEALTH CARE EDUCATION/TRAINING PROGRAM

## 2025-04-16 RX ORDER — LEVOTHYROXINE SODIUM 25 UG/1
25 TABLET ORAL DAILY
Qty: 90 TABLET | Refills: 0 | Status: SHIPPED | OUTPATIENT
Start: 2025-04-16

## 2025-04-16 RX ORDER — DIAZEPAM 5 MG/1
2.5 TABLET ORAL NIGHTLY
COMMUNITY

## 2025-04-16 NOTE — PROGRESS NOTES
Chief Complaint   Patient presents with    Annual Exam          HPI: Melanie Paul is a 54 y.o. female who presents for HM visit     Health Maintenance Due   Topic Date Due    Hepatitis B vaccine (1 of 3 - 19+ 3-dose series) Never done    Pneumococcal 50+ years Vaccine (1 of 2 - PCV) Never done    Colorectal Cancer Screen  Never done    Shingles vaccine (1 of 2) Never done      Vaccines: Due for hep B, pneumococcal, Shingrix, pt would like to defer vaccines today   Colonoscopy: States had this done last year, need records of this   Mammogram: Prior breast cancer, had bilat mastectomy., undergoing current treatment   Pap: Had hysterectomy with cervix removed   Smoking: Never smoker  Low Dose CT:  NA  AAA Screen:  NA  PHQ 2:  UTD  Hep C: UTD  HIV: UTD  DM: UTD  Lipids: UTD  DEXA: NA ,age     - Had low thyroid hormone, started on Synthroid but patient has not yet started taking  Past Medical History:   Diagnosis Date    Breast cancer 08/2019    left    History of therapeutic radiation     Hx antineoplastic chemo     Seizure disorder (HCC) 05/2003    tonic clonic seizures off and on x 7 yrs.-none since 2010       Past Surgical History:   Procedure Laterality Date    BREAST BIOPSY Left 2/4/2020    LEFT BREAST ULTRASOUND GUIDED SEED LOCALIZATION performed by Day Chairez MD at [facility] OR    BREAST ENHANCEMENT SURGERY Bilateral 3/11/2025    BILATERAL BREAST RECONSTRUCTION WITH DIRECT TO IMPLANT RECONSTRUCTION WITH ALLODERM performed by Kuldeep Wakefield MD at [facility] OR    BREAST LUMPECTOMY Left 2/4/2020    LEFT LUMPECTOMY WITH SENTINEL NODE BIOPSY, BIOZORB PLACEMENT performed by Day Chairez MD at [facility] OR    BREAST REDUCTION SURGERY Bilateral 2/4/2020    BILATERAL ONCOPLASTIC BREAST REDUCTION performed by Alla Marinelli MD at [facility] OR    BREAST SURGERY      bx of breast    DILATION AND CURETTAGE OF UTERUS      HYSTERECTOMY (CERVIX STATUS UNKNOWN)      INSERTION / REMOVAL / REPLACEMENT VENOUS ACCESS

## 2025-04-21 ENCOUNTER — OFFICE VISIT (OUTPATIENT)
Dept: SURGERY | Age: 55
End: 2025-04-21

## 2025-04-21 VITALS
DIASTOLIC BLOOD PRESSURE: 84 MMHG | HEART RATE: 82 BPM | OXYGEN SATURATION: 98 % | WEIGHT: 196 LBS | SYSTOLIC BLOOD PRESSURE: 119 MMHG | TEMPERATURE: 98.2 F | BODY MASS INDEX: 28.12 KG/M2

## 2025-04-21 DIAGNOSIS — Z09 POSTOP CHECK: Primary | ICD-10-CM

## 2025-04-21 PROCEDURE — 99024 POSTOP FOLLOW-UP VISIT: CPT | Performed by: SURGERY

## 2025-04-21 NOTE — PROGRESS NOTES
[facility]    PROCEDURE: 1) Immediate bilateral direct to implant breast reconstruction  2) Insertion of Alloderm Acellular Dermal Matrix (328 cm^2))   3) Intraoperative SPY fluorescent angiography                          4) Application of Natasha incisional wound vacuum device  DATE: 3/11/25    Melanie Paul has been recovering well since her procedure. Pain has been well controlled without pain medications.     EXAM    /84   Pulse 82   Temp 98.2 °F (36.8 °C)   Wt 88.9 kg (196 lb)   LMP 02/04/2015   SpO2 98%   BMI 28.12 kg/m²      GEN: NAD   BREAST: Incisions healing appropriately  Good contour    IMP: 54 y.o.female s/p B DTI  PLAN: Doing well. She is happy with her results thus far and will follow-u for discomfort.     Derrek Wakefield MD  [facility]  [phone]  04/21/25

## 2025-04-21 NOTE — TELEPHONE ENCOUNTER
Spoke with patient advised she needs to touch base with her employer[employer] that she is needing an additional two weeks added to her leave so they can send me the extension paperwork she understood and will contact them.

## 2025-04-22 NOTE — TELEPHONE ENCOUNTER
Patient stated that extension paperwork is not required and that the following information can be written on a letterhead signed by the physician.    Information that is needed:  Name  Employee ID# [id number]  [facility] Case# [id number]  Request extension for two weeks  New return date: 05/06/25    It can be faxed to [phone]    Please Attn:[id number]

## 2025-05-06 DIAGNOSIS — E03.9 HYPOTHYROIDISM, UNSPECIFIED TYPE: ICD-10-CM

## 2025-05-06 LAB
T4 FREE SERPL-MCNC: 1.1 NG/DL (ref 0.9–1.8)
TSH SERPL DL<=0.005 MIU/L-ACNC: 1.95 UIU/ML (ref 0.27–4.2)

## 2025-05-07 ENCOUNTER — RESULTS FOLLOW-UP (OUTPATIENT)
Dept: FAMILY MEDICINE CLINIC | Age: 55
End: 2025-05-07

## 2025-05-16 ENCOUNTER — OFFICE VISIT (OUTPATIENT)
Dept: FAMILY MEDICINE CLINIC | Age: 55
End: 2025-05-16
Payer: COMMERCIAL

## 2025-05-16 VITALS
TEMPERATURE: 97.8 F | HEIGHT: 70 IN | HEART RATE: 70 BPM | DIASTOLIC BLOOD PRESSURE: 78 MMHG | SYSTOLIC BLOOD PRESSURE: 110 MMHG | OXYGEN SATURATION: 96 % | BODY MASS INDEX: 28.09 KG/M2 | WEIGHT: 196.2 LBS

## 2025-05-16 DIAGNOSIS — E03.9 HYPOTHYROIDISM, UNSPECIFIED TYPE: Primary | ICD-10-CM

## 2025-05-16 PROCEDURE — 99213 OFFICE O/P EST LOW 20 MIN: CPT | Performed by: STUDENT IN AN ORGANIZED HEALTH CARE EDUCATION/TRAINING PROGRAM

## 2025-05-16 NOTE — PROGRESS NOTES
Chief Complaint   Patient presents with    1 Month Follow-Up          HPI: Melanie Paul is a 54 y.o. female who presents for FUV    #Hypothyroidism   - See prior notes for details, found to be hypothyroidism on labs, started on Synthroid 25 mcg daily, most recent labs showed improvement in this, patient tolerating med dose well, denies side effects, blood pressure well-controlled today       Lab Results   Component Value Date    TSH 1.95 05/06/2025    T4FREE 1.1 05/06/2025        Past Medical History:   Diagnosis Date    Breast cancer (HCC) 08/2019    left    History of therapeutic radiation     Hx antineoplastic chemo     Seizure disorder (HCC) 05/2003    tonic clonic seizures off and on x 7 yrs.-none since 2010       Past Surgical History:   Procedure Laterality Date    BREAST BIOPSY Left 2/4/2020    LEFT BREAST ULTRASOUND GUIDED SEED LOCALIZATION performed by Day Chairez MD at [facility] OR    BREAST ENHANCEMENT SURGERY Bilateral 3/11/2025    BILATERAL BREAST RECONSTRUCTION WITH DIRECT TO IMPLANT RECONSTRUCTION WITH ALLODERM performed by Kuldeep Wakefield MD at [facility] OR    BREAST LUMPECTOMY Left 2/4/2020    LEFT LUMPECTOMY WITH SENTINEL NODE BIOPSY, BIOZORB PLACEMENT performed by Day Chairez MD at [facility] OR    BREAST REDUCTION SURGERY Bilateral 2/4/2020    BILATERAL ONCOPLASTIC BREAST REDUCTION performed by Alla Marinelli MD at [facility] OR    BREAST SURGERY      bx of breast    DILATION AND CURETTAGE OF UTERUS      HYSTERECTOMY (CERVIX STATUS UNKNOWN)      INSERTION / REMOVAL / REPLACEMENT VENOUS ACCESS CATHETER Right 9/16/2019    RIGHT PORT PLACEMENT performed by Day Chairez MD at [facility] OR    KNEE ARTHROSCOPY Right     MASTECTOMY Bilateral 3/11/2025    BILATERAL NIPPLE SPARING MASTECTOMIES, LEFT BREAST SENTINEL NODE BIOPSY performed by Day Chairez MD at [facility] OR    PORT SURGERY Right 7/7/2023    RIGHT PORT REMOVAL performed by Day Chairez MD at [facility] OR    PORT SURGERY N/A

## (undated) DEVICE — SPECIMEN ORIENTATION CHARMS, SIX DISTINCTLY SHAPED STERILE 10MM CHARMS: Brand: MARGINMAP

## (undated) DEVICE — GOWN,SIRUS,POLYRNF,BRTHSLV,LG,30/CS: Brand: MEDLINE

## (undated) DEVICE — E-Z CLEAN, NON-STICK, PTFE COATED, ELECTROSURGICAL BLADE ELECTRODE, 2.5 INCH (6.35 CM): Brand: EZ CLEAN

## (undated) DEVICE — VELCLOSE LATEX FREE ELASTIC BANDAGE D/L 6INX10YD

## (undated) DEVICE — GLOVE ORANGE PI 8 1/2   MSG9085

## (undated) DEVICE — SYRINGE MED 10ML POLYPR LUERSLIP TIP FLAT TOP W/O SFTY DISP

## (undated) DEVICE — GAUZE,SPONGE,4"X4",16PLY,XRAY,STRL,LF: Brand: MEDLINE

## (undated) DEVICE — SURE SET-DOUBLE BASIN-LF: Brand: MEDLINE INDUSTRIES, INC.

## (undated) DEVICE — BOWL MED L 32OZ PLAS W/ MOLD GRAD EZ OPN PEEL PCH

## (undated) DEVICE — Z CONVERTED USE 2271043 CONTAINER SPEC COLL 4OZ SCR ON LID PEEL PCH

## (undated) DEVICE — STERILE POLYISOPRENE POWDER-FREE SURGICAL GLOVES WITH EMOLLIENT COATING: Brand: PROTEXIS

## (undated) DEVICE — STANDARD HYPODERMIC NEEDLE,POLYPROPYLENE HUB: Brand: MONOJECT

## (undated) DEVICE — SUTURE VCRL SZ 3-0 L27IN ABSRB UD L26MM SH 1/2 CIR J416H

## (undated) DEVICE — APPLICATOR MEDICATED 26 CC SOLUTION HI LT ORNG CHLORAPREP

## (undated) DEVICE — SUTURE PDS II + SZ 2-0 L27IN ABSRB UD FS-1 L24MM 3/8 CIR REV PDP443H

## (undated) DEVICE — CLEANER,CAUTERY TIP,2X2",STERILE: Brand: MEDLINE

## (undated) DEVICE — Device

## (undated) DEVICE — C-ARM: Brand: UNBRANDED

## (undated) DEVICE — ELECTRODE PT RET AD L9FT HI MOIST COND ADH HYDRGEL CORDED

## (undated) DEVICE — 450 ML BOTTLE OF 0.05% CHLORHEXIDINE GLUCONATE IN 99.95% STERILE WATER FOR IRRIGATION, USP AND APPLICATOR.: Brand: IRRISEPT ANTIMICROBIAL WOUND LAVAGE

## (undated) DEVICE — SYRINGE IRRIG 60ML SFT PLIABLE BLB EZ TO GRP 1 HND USE W/

## (undated) DEVICE — ADHESIVE SKIN CLSR 0.7ML TOP DERMBND ADV

## (undated) DEVICE — INTENDED FOR TISSUE SEPARATION, AND OTHER PROCEDURES THAT REQUIRE A SHARP SURGICAL BLADE TO PUNCTURE OR CUT.: Brand: BARD-PARKER ® STAINLESS STEEL BLADES

## (undated) DEVICE — PROVE COVER: Brand: UNBRANDED

## (undated) DEVICE — 3M™ IOBAN™ 2 ANTIMICROBIAL INCISE DRAPE 6648EZ: Brand: IOBAN™ 2

## (undated) DEVICE — STERILE LATEX POWDER-FREE SURGICAL GLOVESWITH NITRILE COATING: Brand: PROTEXIS

## (undated) DEVICE — SUTURE ABSORBABLE BRAIDED 4-0 P-3 18 IN UD VICRYL J494G

## (undated) DEVICE — 3M™ TEGADERM™ CHG CHLORHEXIDINE GLUCONATE GEL PAD 1664, 25 EACH/CARTON, 4 CARTONS/CASE: Brand: 3M™ TEGADERM™

## (undated) DEVICE — 3M™ TEGADERM™ TRANSPARENT FILM DRESSING FRAME STYLE, 1626W, 4 IN X 4-3/4 IN (10 CM X 12 CM), 50/CT 4CT/CASE: Brand: 3M™ TEGADERM™

## (undated) DEVICE — SOLUTION PREP PAINT POV IOD FOR SKIN MUCOUS MEM

## (undated) DEVICE — GARMENT,MEDLINE,DVT,INT,CALF,MED, GEN2: Brand: MEDLINE

## (undated) DEVICE — MEDICINE CUP, GRADUATED, STER: Brand: MEDLINE

## (undated) DEVICE — 35 ML SYRINGE LUER-LOCK TIP: Brand: MONOJECT

## (undated) DEVICE — INTENDED FOR TISSUE SEPARATION, AND OTHER PROCEDURES THAT REQUIRE A SHARP SURGICAL BLADE TO PUNCTURE OR CUT.: Brand: BARD-PARKER ® CARBON RIB-BACK BLADES

## (undated) DEVICE — BLANKET WRM W40.2XL55.9IN IORT LO BODY + MISTRAL AIR

## (undated) DEVICE — DRESSING TRNSPAR W5XL4.5IN FLM SHT SEMIPERMEABLE WIND

## (undated) DEVICE — DRAIN SURG 15FR L3 16IN DIA47MM SIL RND HUBLESS FULL FLUT

## (undated) DEVICE — DRESSING PAD FOAM SLF-ADH  7 7/8X11.75IN

## (undated) DEVICE — SKIN AFFIX SURG ADHESIVE 72/CS 0.55ML: Brand: MEDLINE

## (undated) DEVICE — 3M™ STERI-STRIP™ REINFORCED ADHESIVE SKIN CLOSURES, R1547, 1/2 IN X 4 IN (12 MM X 100 MM), 6 STRIPS/ENVELOPE: Brand: 3M™ STERI-STRIP™

## (undated) DEVICE — PLATE ES AD W 9FT CRD 2

## (undated) DEVICE — CHLORAPREP 26ML ORANGE

## (undated) DEVICE — NEEDLE,22GX1.5",REG,BEVEL: Brand: MEDLINE

## (undated) DEVICE — TOWEL,STOP FLAG GOLD N-W: Brand: MEDLINE

## (undated) DEVICE — INTENDED USE FOR SURGICAL MARKING ON INTACT SKIN, ALSO PROVIDES A PERMANENT METHOD OF IDENTIFYING OBJECTS IN THE OPERATING ROOM: Brand: WRITESITE® PLUS MINI PREP RESISTANT MARKER

## (undated) DEVICE — PREMIUM DRY TRAY LF: Brand: MEDLINE INDUSTRIES, INC.

## (undated) DEVICE — DECANTER BAG 9": Brand: MEDLINE INDUSTRIES, INC.

## (undated) DEVICE — TRANSFER SET 3": Brand: MEDLINE INDUSTRIES, INC.

## (undated) DEVICE — SYSTEM SKIN CLSR 22CM DERMBND PRINEO

## (undated) DEVICE — STRIP,CLOSURE,WOUND,MEDI-STRIP,1/2X4: Brand: MEDLINE

## (undated) DEVICE — PLASMABLADE X PS210-030S-LIGHT 3.0SL: Brand: PLASMABLADE™ X

## (undated) DEVICE — DRESSING KIT INCISION MGMT 24X22 CM PREVENA RESTOR ARTH FRM

## (undated) DEVICE — SUTURE MONOCRYL STRATAFIX SPRL + SZ 3-0 L18IN ABSRB UD PS-2 SXMP1B107

## (undated) DEVICE — NEEDLE 25GA X 1.5 IN ECLIPSE

## (undated) DEVICE — BLADE ES ELASTOMERIC COAT INSUL DURABLE BEND UPTO 90DEG

## (undated) DEVICE — TRAP FLUID

## (undated) DEVICE — TOWEL,OR,DSP,ST,BLUE,STD,4/PK,20PK/CS: Brand: MEDLINE

## (undated) DEVICE — PLASTIC MAJOR: Brand: MEDLINE INDUSTRIES, INC.

## (undated) DEVICE — MASTISOL ADHESIVE LIQ 2/3ML

## (undated) DEVICE — MINOR SET UP: Brand: MEDLINE INDUSTRIES, INC.

## (undated) DEVICE — COVER,MAYO STAND,XL,STERILE: Brand: MEDLINE

## (undated) DEVICE — BLADE ES L4IN INSUL EDGE

## (undated) DEVICE — SPONGE,LAP,18"X18",DLX,XR,ST,5/PK,40/PK: Brand: MEDLINE

## (undated) DEVICE — SYRINGE MED 30ML STD CLR PLAS LUERLOCK TIP N CTRL DISP

## (undated) DEVICE — DRESSING SPONGE KERLIX XLG 9.75X10.75

## (undated) DEVICE — COAGULATOR SUCT 10FR LAIN FTSWCH ACTIVATION DISP VALLEYLAB

## (undated) DEVICE — 3M™ STERI-STRIP™ COMPOUND BENZOIN TINCTURE 40 BAGS/CARTON 4 CARTONS/CASE C1544: Brand: 3M™ STERI-STRIP™

## (undated) DEVICE — SOLUTION IV 1000ML 0.9% SOD CHL

## (undated) DEVICE — SUTURE VCRL SZ 3-0 L18IN ABSRB UD L26MM SH 1/2 CIR J864D

## (undated) DEVICE — 12 ML SYRINGE,LUER-LOCK TIP: Brand: MONOJECT

## (undated) DEVICE — DRAPE,CHEST,FENES,15X10,STERIL: Brand: MEDLINE

## (undated) DEVICE — SURGICAL SET UP - SURE SET: Brand: MEDLINE INDUSTRIES, INC.

## (undated) DEVICE — SUTURE VCRL + SZ 3-0 L27IN ABSRB UD L26MM SH 1/2 CIR VCP416H

## (undated) DEVICE — SUTURE MCRYL + SZ 4-0 L27IN ABSRB UD L19MM PS-2 3/8 CIR MCP426H

## (undated) DEVICE — GENERAL: Brand: MEDLINE INDUSTRIES, INC.

## (undated) DEVICE — DRAPE,T,LAPARO,TRANS,STERILE: Brand: MEDLINE

## (undated) DEVICE — SYRINGE 20ML LL S/C 50

## (undated) DEVICE — ELECTROSURGICAL PENCIL ROCKER SWITCH NON COATED BLADE ELECTRODE 10 FT (3 M) CORD HOLSTER: Brand: MEGADYNE

## (undated) DEVICE — AIRLIFE™ ADULT OXYGEN MASK VINYL, UNDER THE CHIN STYLE, 3 IN 1 MASK WITH SAFETY VENT, WITH 7 FEET (2.1 M) CRUSH RESISTANT OXYGEN TUBING: Brand: AIRLIFE™

## (undated) DEVICE — SUTURE MCRYL SZ 4-0 L27IN ABSRB UD L19MM PS-2 1/2 CIR PRIM Y426H

## (undated) DEVICE — SUTURE ETHBND EXCEL SZ 1 L30IN NONABSORBABLE GRN L36MM CT-1 X425H

## (undated) DEVICE — STAPLER SKIN H3.9MM WIRE DIA0.58MM CRWN 6.9MM 35 STPL ROT

## (undated) DEVICE — SOLUTION SCRB 4OZ 7.5% POVIDONE IOD ANTIMIC BTL

## (undated) DEVICE — GLOVE ORANGE PI 7 1/2   MSG9075

## (undated) DEVICE — SYRINGE MED 10ML LUERLOCK TIP W/O SFTY DISP

## (undated) DEVICE — BRA SURG SUPP LG 38-40 IN ZIPPER

## (undated) DEVICE — SUTURE PERMAHAND SZ 3-0 L18IN NONABSORBABLE BLK L26MM SH C013D

## (undated) DEVICE — SYSTEM WND THER 14 DAY 150 CC CANSTR THER UNIT PREVENA + 125

## (undated) DEVICE — PAD FOAM 11.75X7-7/8 IN RESTON LF

## (undated) DEVICE — GLOVE SURG SZ 7 L12IN FNGR THK79MIL GRN LTX FREE

## (undated) DEVICE — CONTROL SYRINGE LUER-LOCK TIP: Brand: MONOJECT

## (undated) DEVICE — COVER US PRB W13XL244CM SURGICAL INTRAOPERATIVE W RUBBERBAND

## (undated) DEVICE — GAUZE,SPONGE,2"X2",8PLY,STERILE,LF,2'S: Brand: MEDLINE

## (undated) DEVICE — DRAIN,WOUND,15FR,3/16,FULL-FLUTED: Brand: MEDLINE

## (undated) DEVICE — SUTURE VCRL SZ 4-0 L18IN ABSRB UD L19MM PS-2 3/8 CIR PRIM J496H

## (undated) DEVICE — 3M™ TEGADERM™ TRANSPARENT FILM DRESSING FRAME STYLE, 1624W, 2-3/8 IN X 2-3/4 IN (6 CM X 7 CM), 100/CT 4CT/CASE: Brand: 3M™ TEGADERM™

## (undated) DEVICE — SUTURE MONOCRYL SZ 3-0 L27IN ABSRB UD PS-2 3/8 CIR REV CUT NDL MCP427H

## (undated) DEVICE — SHEET, T, LAPAROTOMY, STERILE: Brand: MEDLINE

## (undated) DEVICE — SUTURE PDS + SZ 2 0 L27IN ABSRB VLT L26MM CT 2 1 2 CIR PDP333H

## (undated) DEVICE — SOLUTION IV 1000ML 0.9% SOD CHL PH 5 INJ USP VIAFLX PLAS

## (undated) DEVICE — CLIP LIG M BLU TI HRT SHP WIRE HORZ 24 CLIPS/PK 25PK/CA

## (undated) DEVICE — GLOVE SURG SZ 75 L12IN FNGR THK79MIL GRN LTX FREE

## (undated) DEVICE — GLOVE,SURG,SENSICARE SLT,LF,PF,7: Brand: MEDLINE

## (undated) DEVICE — GLOVE,SURG,SENSICARE,ALOE,LF,PF,7: Brand: MEDLINE

## (undated) DEVICE — GLOVE SURG SZ 65 STD WHT LTX SYN POLYMER BEAD REINF ANTI RL

## (undated) DEVICE — E-Z CLEAN, NON-STICK, PTFE COATED, ELECTROSURGICAL BLADE ELECTRODE, MODIFIED EXTENDED INSULATION, 2.5 INCH (6.35 CM): Brand: MEGADYNE

## (undated) DEVICE — SUTURE PROL SZ 3-0 L48IN NONABSORBABLE BLU SH L26MM 1/2 CIR 8534H

## (undated) DEVICE — GAUZE FLUFF 1 PLY: Brand: DEROYAL

## (undated) DEVICE — PACK SURGICAL PROC CUSTOM TISSUE PERFUSION SYSTEM SPY ELITE

## (undated) DEVICE — LIQUIBAND RAPID ADHESIVE 36/CS 0.8ML: Brand: MEDLINE

## (undated) DEVICE — SYRINGE MED 5ML STD CLR PLAS LUERLOCK TIP N CTRL DISP

## (undated) DEVICE — SPONGE GZ W4XL4IN COT 12 PLY TYP VII WVN C FLD DSGN STERILE

## (undated) DEVICE — Z INACTIVE USE 2641837 CLIP LIG M BLU TI HRT SHP WIRE HORZ 600 PER BX

## (undated) DEVICE — COVER LT HNDL BLU PLAS

## (undated) DEVICE — 3M™ WARMING BLANKET, LOWER BODY, 10 PER CASE, 42568: Brand: BAIR HUGGER™

## (undated) DEVICE — JEWISH HOSPITAL TURNOVER KIT: Brand: MEDLINE INDUSTRIES, INC.

## (undated) DEVICE — SUTURE VCRL SZ 4-0 L27IN ABSRB UD L19MM PS-2 3/8 CIR PRIM J426H

## (undated) DEVICE — SUTURE PERMA-HAND SZ 2-0 L30IN NONABSORBABLE BLK L26MM SH K833H

## (undated) DEVICE — DRAPE C ARM UNIV W41XL74IN CLR PLAS XR VELC CLSR POLY STRP

## (undated) DEVICE — TURNOVER KIT RM INF CTRL TECH

## (undated) DEVICE — RESERVOIR,SUCTION,100CC,SILICONE: Brand: MEDLINE

## (undated) DEVICE — TUBING, SUCTION, 1/4" X 12', STRAIGHT: Brand: MEDLINE

## (undated) DEVICE — STERILE PVP: Brand: MEDLINE INDUSTRIES, INC.